# Patient Record
Sex: FEMALE | Race: WHITE | NOT HISPANIC OR LATINO | Employment: FULL TIME | URBAN - METROPOLITAN AREA
[De-identification: names, ages, dates, MRNs, and addresses within clinical notes are randomized per-mention and may not be internally consistent; named-entity substitution may affect disease eponyms.]

---

## 2018-02-26 ENCOUNTER — OFFICE VISIT CONVERTED (OUTPATIENT)
Dept: FAMILY MEDICINE CLINIC | Facility: CLINIC | Age: 23
End: 2018-02-26
Attending: FAMILY MEDICINE

## 2018-04-02 ENCOUNTER — OFFICE VISIT CONVERTED (OUTPATIENT)
Dept: FAMILY MEDICINE CLINIC | Facility: CLINIC | Age: 23
End: 2018-04-02
Attending: FAMILY MEDICINE

## 2018-05-03 ENCOUNTER — OFFICE VISIT CONVERTED (OUTPATIENT)
Dept: FAMILY MEDICINE CLINIC | Facility: CLINIC | Age: 23
End: 2018-05-03
Attending: FAMILY MEDICINE

## 2018-05-03 ENCOUNTER — CONVERSION ENCOUNTER (OUTPATIENT)
Dept: FAMILY MEDICINE CLINIC | Facility: CLINIC | Age: 23
End: 2018-05-03

## 2018-07-27 ENCOUNTER — OFFICE VISIT CONVERTED (OUTPATIENT)
Dept: FAMILY MEDICINE CLINIC | Facility: CLINIC | Age: 23
End: 2018-07-27
Attending: NURSE PRACTITIONER

## 2019-01-04 ENCOUNTER — HOSPITAL ENCOUNTER (OUTPATIENT)
Dept: OTHER | Facility: HOSPITAL | Age: 24
Discharge: HOME | End: 2019-01-04

## 2019-01-04 LAB
ANION GAP SERPL CALC-SCNC: 18 MMOL/L (ref 8–19)
BUN SERPL-MCNC: 9 MG/DL (ref 5–25)
BUN/CREAT SERPL: 13 {RATIO} (ref 6–20)
CALCIUM SERPL-MCNC: 9.5 MG/DL (ref 8.7–10.4)
CHLORIDE SERPL-SCNC: 99 MMOL/L (ref 99–111)
CONV CO2: 24 MMOL/L (ref 22–32)
CREAT UR-MCNC: 0.69 MG/DL (ref 0.5–0.9)
GFR SERPLBLD BASED ON 1.73 SQ M-ARVRAT: >60 ML/MIN/{1.73_M2}
GLUCOSE SERPL-MCNC: 67 MG/DL (ref 65–99)
OSMOLALITY SERPL CALC.SUM OF ELEC: 281 MOSM/KG (ref 273–304)
POTASSIUM SERPL-SCNC: 3.8 MMOL/L (ref 3.5–5.3)
SODIUM SERPL-SCNC: 137 MMOL/L (ref 135–147)
T4 FREE SERPL-MCNC: 1.1 NG/DL (ref 0.9–1.8)
TSH SERPL-ACNC: 1.84 M[IU]/L (ref 0.27–4.2)

## 2019-01-14 ENCOUNTER — HOSPITAL ENCOUNTER (OUTPATIENT)
Dept: OTHER | Facility: HOSPITAL | Age: 24
Discharge: HOME | End: 2019-01-14

## 2019-01-14 LAB
BASOPHILS # BLD AUTO: 0.06 10*3/UL (ref 0–0.2)
BASOPHILS NFR BLD AUTO: 0.65 % (ref 0–3)
EOSINOPHIL # BLD AUTO: 0.71 10*3/UL (ref 0–0.7)
EOSINOPHIL # BLD AUTO: 7.52 % (ref 0–7)
ERYTHROCYTE [DISTWIDTH] IN BLOOD BY AUTOMATED COUNT: 12 % (ref 11.5–14.5)
HBA1C MFR BLD: 13.6 G/DL (ref 12–16)
HCT VFR BLD AUTO: 41.3 % (ref 37–47)
LYMPHOCYTES # BLD AUTO: 2.89 10*3/UL (ref 1–5)
MCH RBC QN AUTO: 31.2 PG (ref 27–31)
MCHC RBC AUTO-ENTMCNC: 32.9 G/DL (ref 33–37)
MCV RBC AUTO: 95 FL (ref 81–99)
MONOCYTES # BLD AUTO: 0.53 10*3/UL (ref 0.2–1.2)
MONOCYTES NFR BLD AUTO: 5.59 % (ref 3–10)
NEUTROPHILS # BLD AUTO: 5.27 10*3/UL (ref 2–8)
NEUTROPHILS NFR BLD AUTO: 55.7 % (ref 30–85)
NRBC BLD AUTO-RTO: 0 % (ref 0–0.01)
PLATELET # BLD AUTO: 263 10*3/UL (ref 130–400)
PMV BLD AUTO: 8.3 FL (ref 7.4–10.4)
RBC # BLD AUTO: 4.34 10*6/UL (ref 4.2–5.4)
VARIANT LYMPHS NFR BLD MANUAL: 30.5 % (ref 20–45)
WBC # BLD AUTO: 9.46 10*3/UL (ref 4.8–10.8)

## 2019-02-09 ENCOUNTER — HOSPITAL ENCOUNTER (OUTPATIENT)
Dept: URGENT CARE | Facility: CLINIC | Age: 24
Discharge: HOME | End: 2019-02-09
Attending: NURSE PRACTITIONER

## 2019-02-15 ENCOUNTER — OFFICE VISIT CONVERTED (OUTPATIENT)
Dept: FAMILY MEDICINE CLINIC | Facility: CLINIC | Age: 24
End: 2019-02-15
Attending: FAMILY MEDICINE

## 2019-02-15 ENCOUNTER — CONVERSION ENCOUNTER (OUTPATIENT)
Dept: FAMILY MEDICINE CLINIC | Facility: CLINIC | Age: 24
End: 2019-02-15

## 2019-04-06 ENCOUNTER — HOSPITAL ENCOUNTER (OUTPATIENT)
Dept: OTHER | Facility: HOSPITAL | Age: 24
Discharge: HOME | End: 2019-04-06

## 2019-05-24 ENCOUNTER — CONVERSION ENCOUNTER (OUTPATIENT)
Dept: FAMILY MEDICINE CLINIC | Facility: CLINIC | Age: 24
End: 2019-05-24

## 2019-05-24 ENCOUNTER — OFFICE VISIT CONVERTED (OUTPATIENT)
Dept: FAMILY MEDICINE CLINIC | Facility: CLINIC | Age: 24
End: 2019-05-24
Attending: FAMILY MEDICINE

## 2019-09-27 ENCOUNTER — HOSPITAL ENCOUNTER (OUTPATIENT)
Dept: OTHER | Facility: HOSPITAL | Age: 24
Discharge: HOME | End: 2019-09-27

## 2019-09-27 LAB
ALBUMIN SERPL-MCNC: 5 G/DL (ref 3.5–5)
ALBUMIN/GLOB SERPL: 1.9 {RATIO} (ref 1.4–2.6)
ALP SERPL-CCNC: 48 U/L (ref 42–98)
ALT SERPL-CCNC: 6 U/L (ref 10–40)
ANION GAP SERPL CALC-SCNC: 16 MMOL/L (ref 8–19)
AST SERPL-CCNC: 10 U/L (ref 15–50)
BASOPHILS # BLD AUTO: 0.08 10*3/UL (ref 0–0.2)
BASOPHILS NFR BLD AUTO: 1.1 % (ref 0–3)
BILIRUB SERPL-MCNC: 0.4 MG/DL (ref 0.2–1.3)
BUN SERPL-MCNC: 9 MG/DL (ref 5–25)
BUN/CREAT SERPL: 11 {RATIO} (ref 6–20)
CALCIUM SERPL-MCNC: 9.8 MG/DL (ref 8.7–10.4)
CHLORIDE SERPL-SCNC: 105 MMOL/L (ref 99–111)
CHOLEST SERPL-MCNC: 143 MG/DL (ref 107–200)
CHOLEST/HDLC SERPL: 2.5 {RATIO} (ref 3–6)
CONV ABS IMM GRAN: 0.04 10*3/UL (ref 0–0.2)
CONV CO2: 22 MMOL/L (ref 22–32)
CONV IMMATURE GRAN: 0.6 % (ref 0–1.8)
CONV TOTAL PROTEIN: 7.7 G/DL (ref 6.3–8.2)
CREAT UR-MCNC: 0.81 MG/DL (ref 0.5–0.9)
DEPRECATED RDW RBC AUTO: 40.5 FL (ref 36.4–46.3)
EOSINOPHIL # BLD AUTO: 0.23 10*3/UL (ref 0–0.7)
EOSINOPHIL # BLD AUTO: 3.2 % (ref 0–7)
ERYTHROCYTE [DISTWIDTH] IN BLOOD BY AUTOMATED COUNT: 12 % (ref 11.7–14.4)
GFR SERPLBLD BASED ON 1.73 SQ M-ARVRAT: >60 ML/MIN/{1.73_M2}
GLOBULIN UR ELPH-MCNC: 2.7 G/DL (ref 2–3.5)
GLUCOSE SERPL-MCNC: 79 MG/DL (ref 65–99)
HCT VFR BLD AUTO: 41.3 % (ref 37–47)
HDLC SERPL-MCNC: 58 MG/DL (ref 40–60)
HGB BLD-MCNC: 13.4 G/DL (ref 12–16)
LDLC SERPL CALC-MCNC: 63 MG/DL (ref 70–100)
LYMPHOCYTES # BLD AUTO: 1.94 10*3/UL (ref 1–5)
LYMPHOCYTES NFR BLD AUTO: 27.4 % (ref 20–45)
MCH RBC QN AUTO: 29.8 PG (ref 27–31)
MCHC RBC AUTO-ENTMCNC: 32.4 G/DL (ref 33–37)
MCV RBC AUTO: 92 FL (ref 81–99)
MONOCYTES # BLD AUTO: 0.31 10*3/UL (ref 0.2–1.2)
MONOCYTES NFR BLD AUTO: 4.4 % (ref 3–10)
NEUTROPHILS # BLD AUTO: 4.48 10*3/UL (ref 2–8)
NEUTROPHILS NFR BLD AUTO: 63.3 % (ref 30–85)
NRBC CBCN: 0 % (ref 0–0.7)
OSMOLALITY SERPL CALC.SUM OF ELEC: 286 MOSM/KG (ref 273–304)
PLATELET # BLD AUTO: 237 10*3/UL (ref 130–400)
PMV BLD AUTO: 11 FL (ref 9.4–12.3)
POTASSIUM SERPL-SCNC: 4.2 MMOL/L (ref 3.5–5.3)
RBC # BLD AUTO: 4.49 10*6/UL (ref 4.2–5.4)
SODIUM SERPL-SCNC: 139 MMOL/L (ref 135–147)
TRIGL SERPL-MCNC: 109 MG/DL (ref 40–150)
TSH SERPL-ACNC: 1.86 M[IU]/L (ref 0.27–4.2)
VLDLC SERPL-MCNC: 22 MG/DL (ref 5–37)
WBC # BLD AUTO: 7.08 10*3/UL (ref 4.8–10.8)

## 2019-11-18 ENCOUNTER — HOSPITAL ENCOUNTER (OUTPATIENT)
Dept: FAMILY MEDICINE CLINIC | Facility: CLINIC | Age: 24
Discharge: HOME | End: 2019-11-18
Attending: FAMILY MEDICINE

## 2019-11-18 LAB
25(OH)D3 SERPL-MCNC: 27.8 NG/ML (ref 30–100)
ALBUMIN SERPL-MCNC: 4.3 G/DL (ref 3.5–5)
ALBUMIN/GLOB SERPL: 1.4 {RATIO} (ref 1.4–2.6)
ALP SERPL-CCNC: 39 U/L (ref 42–98)
ALT SERPL-CCNC: 6 U/L (ref 10–40)
ANION GAP SERPL CALC-SCNC: 18 MMOL/L (ref 8–19)
AST SERPL-CCNC: 10 U/L (ref 15–50)
BASOPHILS # BLD AUTO: 0.08 10*3/UL (ref 0–0.2)
BASOPHILS NFR BLD AUTO: 1.2 % (ref 0–3)
BILIRUB SERPL-MCNC: 0.36 MG/DL (ref 0.2–1.3)
BUN SERPL-MCNC: 8 MG/DL (ref 5–25)
BUN/CREAT SERPL: 10 {RATIO} (ref 6–20)
CALCIUM SERPL-MCNC: 9.3 MG/DL (ref 8.7–10.4)
CHLORIDE SERPL-SCNC: 104 MMOL/L (ref 99–111)
CHOLEST SERPL-MCNC: 182 MG/DL (ref 107–200)
CHOLEST/HDLC SERPL: 2.9 {RATIO} (ref 3–6)
CONV ABS IMM GRAN: 0.01 10*3/UL (ref 0–0.2)
CONV CO2: 22 MMOL/L (ref 22–32)
CONV IMMATURE GRAN: 0.2 % (ref 0–1.8)
CONV TOTAL PROTEIN: 7.3 G/DL (ref 6.3–8.2)
CREAT UR-MCNC: 0.79 MG/DL (ref 0.5–0.9)
DEPRECATED RDW RBC AUTO: 41.3 FL (ref 36.4–46.3)
EOSINOPHIL # BLD AUTO: 0.52 10*3/UL (ref 0–0.7)
EOSINOPHIL # BLD AUTO: 7.9 % (ref 0–7)
ERYTHROCYTE [DISTWIDTH] IN BLOOD BY AUTOMATED COUNT: 12 % (ref 11.7–14.4)
GFR SERPLBLD BASED ON 1.73 SQ M-ARVRAT: >60 ML/MIN/{1.73_M2}
GLOBULIN UR ELPH-MCNC: 3 G/DL (ref 2–3.5)
GLUCOSE SERPL-MCNC: 96 MG/DL (ref 65–99)
HCT VFR BLD AUTO: 41 % (ref 37–47)
HDLC SERPL-MCNC: 62 MG/DL (ref 40–60)
HGB BLD-MCNC: 13.6 G/DL (ref 12–16)
LDLC SERPL CALC-MCNC: 96 MG/DL (ref 70–100)
LYMPHOCYTES # BLD AUTO: 2.61 10*3/UL (ref 1–5)
LYMPHOCYTES NFR BLD AUTO: 39.5 % (ref 20–45)
MCH RBC QN AUTO: 30.7 PG (ref 27–31)
MCHC RBC AUTO-ENTMCNC: 33.2 G/DL (ref 33–37)
MCV RBC AUTO: 92.6 FL (ref 81–99)
MONOCYTES # BLD AUTO: 0.28 10*3/UL (ref 0.2–1.2)
MONOCYTES NFR BLD AUTO: 4.2 % (ref 3–10)
NEUTROPHILS # BLD AUTO: 3.11 10*3/UL (ref 2–8)
NEUTROPHILS NFR BLD AUTO: 47 % (ref 30–85)
NRBC CBCN: 0 % (ref 0–0.7)
OSMOLALITY SERPL CALC.SUM OF ELEC: 288 MOSM/KG (ref 273–304)
PLATELET # BLD AUTO: 262 10*3/UL (ref 130–400)
PMV BLD AUTO: 11.4 FL (ref 9.4–12.3)
POTASSIUM SERPL-SCNC: 3.5 MMOL/L (ref 3.5–5.3)
RBC # BLD AUTO: 4.43 10*6/UL (ref 4.2–5.4)
SODIUM SERPL-SCNC: 140 MMOL/L (ref 135–147)
TRIGL SERPL-MCNC: 122 MG/DL (ref 40–150)
TSH SERPL-ACNC: 3.09 M[IU]/L (ref 0.27–4.2)
VLDLC SERPL-MCNC: 24 MG/DL (ref 5–37)
WBC # BLD AUTO: 6.61 10*3/UL (ref 4.8–10.8)

## 2019-11-22 ENCOUNTER — CONVERSION ENCOUNTER (OUTPATIENT)
Dept: FAMILY MEDICINE CLINIC | Facility: CLINIC | Age: 24
End: 2019-11-22

## 2019-11-22 ENCOUNTER — OFFICE VISIT CONVERTED (OUTPATIENT)
Dept: FAMILY MEDICINE CLINIC | Facility: CLINIC | Age: 24
End: 2019-11-22
Attending: FAMILY MEDICINE

## 2020-01-17 ENCOUNTER — OFFICE VISIT CONVERTED (OUTPATIENT)
Dept: ORTHOPEDIC SURGERY | Facility: CLINIC | Age: 25
End: 2020-01-17
Attending: PHYSICIAN ASSISTANT

## 2020-05-06 ENCOUNTER — CONVERSION ENCOUNTER (OUTPATIENT)
Dept: OTHER | Facility: HOSPITAL | Age: 25
End: 2020-05-06

## 2020-05-06 ENCOUNTER — HOSPITAL ENCOUNTER (OUTPATIENT)
Dept: OTHER | Facility: HOSPITAL | Age: 25
Discharge: HOME | End: 2020-05-06

## 2020-05-07 ENCOUNTER — HOSPITAL ENCOUNTER (OUTPATIENT)
Dept: OTHER | Facility: HOSPITAL | Age: 25
Discharge: HOME | End: 2020-05-07
Attending: PHYSICIAN ASSISTANT

## 2020-05-07 ENCOUNTER — CONVERSION ENCOUNTER (OUTPATIENT)
Dept: OTHER | Facility: HOSPITAL | Age: 25
End: 2020-05-07

## 2020-05-08 LAB — SARS-COV-2 RNA SPEC QL NAA+PROBE: NOT DETECTED

## 2020-05-10 LAB — SARS-COV-2 RNA SPEC QL NAA+PROBE: NOT DETECTED

## 2020-06-10 ENCOUNTER — HOSPITAL ENCOUNTER (OUTPATIENT)
Dept: OTHER | Facility: HOSPITAL | Age: 25
Discharge: HOME | End: 2020-06-10
Attending: INTERNAL MEDICINE

## 2020-06-10 LAB
T4 FREE SERPL-MCNC: 1.3 NG/DL (ref 0.9–1.8)
TSH SERPL-ACNC: 2.41 M[IU]/L (ref 0.27–4.2)

## 2020-11-23 ENCOUNTER — HOSPITAL ENCOUNTER (OUTPATIENT)
Dept: FAMILY MEDICINE CLINIC | Facility: CLINIC | Age: 25
Discharge: HOME | End: 2020-11-23
Attending: PHYSICIAN ASSISTANT

## 2020-11-27 LAB — SARS-COV-2 RNA SPEC QL NAA+PROBE: NOT DETECTED

## 2020-12-07 ENCOUNTER — OFFICE VISIT CONVERTED (OUTPATIENT)
Dept: FAMILY MEDICINE CLINIC | Facility: CLINIC | Age: 25
End: 2020-12-07
Attending: FAMILY MEDICINE

## 2020-12-09 ENCOUNTER — HOSPITAL ENCOUNTER (OUTPATIENT)
Dept: LAB | Facility: HOSPITAL | Age: 25
Discharge: HOME | End: 2020-12-09
Attending: FAMILY MEDICINE

## 2020-12-09 LAB
25(OH)D3 SERPL-MCNC: 25.5 NG/ML (ref 30–100)
ALBUMIN SERPL-MCNC: 4.5 G/DL (ref 3.5–5)
ALBUMIN/GLOB SERPL: 1.9 {RATIO} (ref 1.4–2.6)
ALP SERPL-CCNC: 58 U/L (ref 42–98)
ALT SERPL-CCNC: 10 U/L (ref 10–40)
ANION GAP SERPL CALC-SCNC: 15 MMOL/L (ref 8–19)
AST SERPL-CCNC: 13 U/L (ref 15–50)
BILIRUB SERPL-MCNC: 0.33 MG/DL (ref 0.2–1.3)
BUN SERPL-MCNC: 8 MG/DL (ref 5–25)
BUN/CREAT SERPL: 9 {RATIO} (ref 6–20)
CALCIUM SERPL-MCNC: 9.3 MG/DL (ref 8.7–10.4)
CHLORIDE SERPL-SCNC: 105 MMOL/L (ref 99–111)
CONV CO2: 23 MMOL/L (ref 22–32)
CONV TOTAL PROTEIN: 6.9 G/DL (ref 6.3–8.2)
CREAT UR-MCNC: 0.86 MG/DL (ref 0.5–0.9)
FOLATE SERPL-MCNC: 12.1 NG/ML (ref 4.8–20)
GFR SERPLBLD BASED ON 1.73 SQ M-ARVRAT: >60 ML/MIN/{1.73_M2}
GLOBULIN UR ELPH-MCNC: 2.4 G/DL (ref 2–3.5)
GLUCOSE SERPL-MCNC: 89 MG/DL (ref 65–99)
OSMOLALITY SERPL CALC.SUM OF ELEC: 286 MOSM/KG (ref 273–304)
POTASSIUM SERPL-SCNC: 3.9 MMOL/L (ref 3.5–5.3)
SODIUM SERPL-SCNC: 139 MMOL/L (ref 135–147)
T4 FREE SERPL-MCNC: 1.2 NG/DL (ref 0.9–1.8)
TSH SERPL-ACNC: 1.01 M[IU]/L (ref 0.27–4.2)
VIT B12 SERPL-MCNC: 428 PG/ML (ref 211–911)

## 2020-12-10 LAB — SARS-COV-2 AB SERPL QL IA: NEGATIVE

## 2020-12-28 ENCOUNTER — HOSPITAL ENCOUNTER (OUTPATIENT)
Dept: OTHER | Facility: HOSPITAL | Age: 25
Discharge: HOME | End: 2020-12-28
Attending: INTERNAL MEDICINE

## 2021-01-21 ENCOUNTER — HOSPITAL ENCOUNTER (OUTPATIENT)
Dept: OTHER | Facility: HOSPITAL | Age: 26
Discharge: HOME | End: 2021-01-21
Attending: INTERNAL MEDICINE

## 2021-03-29 ENCOUNTER — OFFICE VISIT CONVERTED (OUTPATIENT)
Dept: FAMILY MEDICINE CLINIC | Facility: CLINIC | Age: 26
End: 2021-03-29
Attending: PHYSICIAN ASSISTANT

## 2021-04-01 ENCOUNTER — HOSPITAL ENCOUNTER (OUTPATIENT)
Dept: GENERAL RADIOLOGY | Facility: HOSPITAL | Age: 26
Discharge: HOME | End: 2021-04-01
Attending: PHYSICIAN ASSISTANT

## 2021-04-01 ENCOUNTER — HOSPITAL ENCOUNTER (OUTPATIENT)
Dept: LAB | Facility: HOSPITAL | Age: 26
Discharge: HOME | End: 2021-04-01
Attending: PHYSICIAN ASSISTANT

## 2021-04-13 ENCOUNTER — OFFICE VISIT CONVERTED (OUTPATIENT)
Dept: CARDIOLOGY | Facility: CLINIC | Age: 26
End: 2021-04-13
Attending: SPECIALIST

## 2021-04-21 ENCOUNTER — HOSPITAL ENCOUNTER (OUTPATIENT)
Dept: GENERAL RADIOLOGY | Facility: HOSPITAL | Age: 26
Discharge: HOME | End: 2021-04-21
Attending: PHYSICIAN ASSISTANT

## 2021-05-11 NOTE — PROCEDURES
"   Procedure Note      Patient Name: Maurice Lazo   Patient ID: [id number]   Sex: Female   YOB: 1995    Primary Care Provider: Shahriar De Leon PA-C   Referring Provider: Shahriar De Leon PA-C    Visit Date: April 13, 2021    Provider: Craig Silvestre MD   Location: [facility]   Location Address: [address] [place] [address], [address]   [place]  [id number]   Location Phone: [phone]          FINAL REPORT   TRANSTHORACIC ECHOCARDIOGRAM REPORT    Diagnosis: Tachycardia   Height: 5'10\" Weight: 142 B/P: 118/68 BSA: 1.8   Tech: BNS   MEASUREMENTS:  RVID (Diastole) : RVID. (NORMAL: 0.7 to 2.4 cm max)   LVID (Systole): 2.9 cm (Diastole): 4.3 cm . (NORMAL: 3.7 - 5.4 cm)   Posterior Wall Thickness (Diastole): 0.7 cm. (NORMAL: 0.8 - 1.1 cm)   Septal Thickness (Diastole): 0.5 cm. (NORMAL: 0.7 - 1.2 cm)   LAID (Systole): 2.4 cm. (NORMAL: 1.9 - 3.8 cm)   Aortic Root Diameter (Diastole): 3.2 cm. (NORMAL: 2.0 - 3.7 cm)   DOPPLER:  E/A ratio 1.4 (NORMAL 0.8-2.0)   DT: 183 msec (NORMAL 140-240 msec.)   IVRT 77 m/sec (NORMAL  m/sec.)   E/E': 5 (NORMAL <8 avg.)   COMMENTS:  The patient underwent 2-D, M-Mode, and Doppler examination, including pulse-wave, continuous-wave, and color-flow analysis; the study is technically adequate.   FINDINGS:  AORTIC VALVE: Normal. Tricuspid in appearance with normal central closure.   MITRAL VALVE: Normal. Bicuspid in appearance.   TRICUSPID VALVE: Normal.   PULMONIC VALVE: Not well visualized.   LEFT ATRIUM: Normal. No intracavitary masses or clots seen. LA volume index is 14 mL/m2.   AORTIC ROOT: Normal in size with adequate motion.   LEFT VENTRICLE: Normal left ventricular systolic function. Ejection fraction 57%.   RIGHT ATRIUM: Normal.   RIGHT VENTRICLE: Normal size and function.   PERICARDIUM: Unremarkable. No evidence of effusion.   INFERIOR VENA CAVA: Diameter is 1.1 cm.   DOPPLER: Doppler examination of the aortic, mitral, tricuspid, and pulmonary valves was " performed. Normal pulmonary artery systolic pressure by Doppler. There was trace mitral regurgitation.   Faxed: 04/16/2021      CONCLUSION:  1.  Normal left ventricular systolic function.  2.  Trace mitral regurgitation.      Craig Silvestre MD  APARNA/pap                 Electronically Signed by: Laura Amaya-, Other -Author on April 16, 2021 10:20:45 AM  Electronically Co-signed by: Craig Silvestre MD -Reviewer on April 29, 2021 09:49:32 AM

## 2021-05-11 NOTE — PROCEDURES
Procedure Note      Patient Name: Maurice Lazo   Patient ID: [id number]   Sex: Female   YOB: 1995    Primary Care Provider: Shahriar De Leon PA-C   Referring Provider: Shahriar De Leon PA-C    Visit Date: April 13, 2021    Provider: Craig Silvestre MD   Location: [facility]   Location Address: [address] [place] [address], [place], [place]  [id number]   Location Phone: [phone]          FINAL REPORT   HOLTER MONITOR REPORT  Date: 04/13/2021   Indication: Tachycardia      Interpretation Date:  04/15/2021    24-HOUR HOLTER MONITOR    FINDINGS:   The patient was monitored for a period of 24 hours.  The maximum heart rate was 157 beats per minute, the minimum was 73 beats per minute. The average was 103 beats per minute. There were occasional PACs and PVCs.     CONCLUSION:    24-hour Holter monitor study reveals sinus rhythm. There is underlying sinus tachycardia. Occasional PACs and PVCs.      Craig Silvestre MD  APARNA/pap                   Electronically Signed by: Laura Amaya-, Other -Author on April 16, 2021 10:26:08 AM  Electronically Co-signed by: Craig Silvestre MD -Reviewer on April 29, 2021 09:51:44 AM

## 2021-05-14 VITALS
OXYGEN SATURATION: 97 % | RESPIRATION RATE: 18 BRPM | DIASTOLIC BLOOD PRESSURE: 68 MMHG | SYSTOLIC BLOOD PRESSURE: 118 MMHG | BODY MASS INDEX: 20.96 KG/M2 | TEMPERATURE: 98.1 F | HEIGHT: 69 IN | HEART RATE: 112 BPM | WEIGHT: 141.5 LBS

## 2021-05-14 NOTE — PROGRESS NOTES
Progress Note      Patient Name: Maurice Lazo   Patient ID: [id number]   Sex: Female   YOB: 1995    Primary Care Provider: Jarred Mendenhall MD    Visit Date: 2021    Provider: Shahriar De Leon PA-C   Location: [facility]   Location Address: [address] [place] [address], [address]  [place], [place]  [id number]   Location Phone: [phone]          Chief Complaint  · new patient/establish care  · bilateral hip pain/left side neck      History Of Present Illness  Maurice Lazo is a 25 year old /White female who presents for evaluation and treatment of: new patient/establish care.      pt presents today as new patient/establish care.    pt previous PCP was Jarred Mendenhall MD.    pt has a hx of elevated HR, anxiety, migraine, asthma and thyroid disease.    pt has been having bilateral hip and left neck pain for the last 6 months. pt has seen chiro and was told no curve in neck. no injuries. pt states she is stiff all the time.    pts HR has been elevated on and off for the last 3 years, runs in 120s. pt has occ palpitations w/tunnel vision. EKG  normal.    pt is currently taking levothyroxine 50mcg for thyroid disease, pt was dx in .    pt currently sees psych-LACHELLE Wing for bipolar 1, currently taking Pristiq 50mg    pt has had both Covid vaccines    Labs   pap -EPW Herrera  flu 10/20    Pt was tx for JRA as a child for about 1 year then was told she just has double joints.   vaginal delivery  Pt does smoke occ.  Quitting       Past Medical History  Disease Name Date Onset Notes   Allergy --  --    Anxiety --  --    Asthma --  --    Bipolar 1 disorder --  --    Depression --  --    Migraine headache --  --    Reflux Disease --  --    Thyroid disorder --  --          Past Surgical History  Procedure Name Date Notes   Rotator Cuff repair --  --    Surgical Clips --  --          Medication List  Name Date Started Instructions   albuterol sulfate  2.5 mg /3 mL (0.083 %) inhalation solution for nebulization 02/26/2018 inhale 3 milliliters (2.5 mg) by nebulization route 3 times per day as needed   albuterol sulfate 90 mcg/actuation inhalation HFA aerosol inhaler 04/01/2021 inhale 2 puffs (180 mcg) by inhalation route every 4-6 hours as needed   Allegra Allergy 180 mg oral tablet  take 1 tablet (180 mg) by oral route once daily   cholecalciferol (vitamin D3) 1,250 mcg (50,000 unit) oral capsule 12/14/2020 take 1 capsule by oral route q weekly   cyclobenzaprine 5 mg oral tablet 12/15/2020 take 1 tablet (5 mg) by oral route BID prn   levothyroxine 50 mcg oral tablet 06/09/2020 take 1 tablet by oral route daily for 30 days   Pristiq 50 mg oral tablet extended release 24 hr  take 1 tablet (50 mg) by oral route once daily   Singulair 10 mg oral tablet 12/07/2020 TAKE ONE TABLET BY MOUTH DAILY IN THE EVENING   trazodone 50 mg oral tablet 12/07/2020 take 1 tablet (50 mg) by oral route once daily at bedtime for 30 days   Tums 200 mg calcium (500 mg) oral tablet,chewable  chew 1 tablet by oral route As needed         Allergy List  Allergen Name Date Reaction Notes   Demerol --  --  --    Lamictal --  --  --          Family Medical History  Disease Name Relative/Age Notes   Stroke Mother/   Mother   Lung cancer Grandmother (maternal)/   --          Social History  Finding Status Start/Stop Quantity Notes   Alcohol Current some day --/-- --  --    Alcohol Use Current some day --/-- --  rarely drinks   . --  --/-- --  --    lives alone --  --/-- --  --    Recreational Drug Use Never --/-- --  no   Tobacco Current every day --/-- 1/2 PPD current every day smoker, 1 packs per day, smoked 6-10 years   Working --  --/-- --  --          Immunizations  NameDate Admin Mfg Trade Name Lot Number Route Inj VIS Given VIS Publication   Hepatitis A05/24/2019 SKB HAVRIX-ADULT [id number] IM LD 05/24/2019    Comments:    Hepatitis A05/03/2018 JOSE HAVRIX-ADULT  IM LD 05/03/2018  10/25/2011   Comments:    Tcwzubhzm01/17/2017 SKB Fluarix, quadrivalent, preservative free [id number] IM LD 10/17/2017 08/07/2015   Comments: pt left in stable condition   Yradumnwu30/07/2018 MSD VARIVAX [id number] SC  12/07/2018 02/12/2018   Comments: pt left office in stable condition   Choprshnm07/06/2018 MSD VARIVAX [id number] SC  11/06/2018 02/12/2018   Comments: pt left office in stable condition         Review of Systems  · Constitutional  o Denies  o : fever, fatigue, weight loss, weight gain  · Cardiovascular  o Denies  o : lower extremity edema, claudication, chest pressure, palpitations  · Respiratory  o Denies  o : shortness of breath, wheezing, cough, hemoptysis, dyspnea on exertion  · Gastrointestinal  o Denies  o : nausea, vomiting, diarrhea, constipation, abdominal pain      Physical Examination  · Constitutional  o Appearance  o : well developed, well-nourished, no acute distress  · Head and Face  o Head  o : normocephalic, atraumatic  · Ears, Nose, Mouth and Throat  o Ears  o :   § External Ears  § : external auditory canal appearance normal, no discharge present  § Otoscopic Examination  § : tympanic membranes pearly white/gray bilaterally  o Nose  o :   § External Nose  § : no lesions noted  § Nasopharynx  § : no discharge present  o Oral Cavity  o :   § Oral Mucosa  § : oral mucosa light pink  o Throat  o :   § Oropharynx  § : tonsils without exudate, no palatal petechiae  · Neck  o Inspection/Palpation  o : tenderness present   o Thyroid  o : gland size normal, nontender, no nodules or masses present on palpation  · Respiratory  o Respiratory Effort  o : breathing unlabored  o Inspection of Chest  o : chest rise symmetric bilaterally  o Auscultation of Lungs  o : clear to auscultation bilaterally throughout inspiration and expiration  · Cardiovascular  o Heart  o :   § Auscultation of Heart  § : regular rate and rhythm, no murmurs, gallops or rubs  o Peripheral Vascular System  o :   § Extremities  § :  no edema  · Lymphatic  o Neck  o : no cervical lymphadenopathy, no supraclavicular lymphadenopathy  · Psychiatric  o Mood and Affect  o : mood normal, affect appropriate     BACK - palp tend in the lumbar spine, full rom, neg SLR  EKG- NSR without any acute changes           Assessment  · Anxiety disorder     300.00/F41.9  · Asthma     493.90/J45.909  · Cervical pain (neck)     723.1/M54.2  · Depression     311/F32.9  · Hypothyroidism     244.9/E03.9  · Nicotine dependence     305.1/F17.200  · Polyarthralgia     719.49/M25.50  · Screening for depression     V79.0/Z13.89  · Need for influenza vaccination     V04.81/Z23  · Migraine     346.90/G43.909  · Thyroid disease     246.9/E07.9  · Bipolar 1 disorder     296.7/F31.9  · Tachycardia     785.0/R00.0      Plan  · Orders  o Cervical Spine Complete [facility] (11264) - 723.1/M54.2 - 03/29/2021  o RA Profile 2 (Ca, Phos, Alk Phos, CRP, ESR, Uric Acid, ASO, RF IgM, DORINA) [facility] (20562, 79754, 84324, 75632, 13706, 72898, 11135, 07444, 72343) - 719.49/M25.50 - 03/29/2021  o ACO-18: Negative screen for clinical depression using a standardized tool () - V79.0/Z13.89 - 03/29/2021  o ACO-14: Influenza immunization was not administered for reasons documented () - V04.81/Z23 - 03/29/2021   rcvd 10/20  o ACO-39: Current medications updated and reviewed (6039F, ) - - 03/29/2021  o Hip Bilateral with AP Pelvis X-Ray [facility] (25360) - - 03/29/2021  o EKG (Recording and Interpretation) [facility] (Done and read at [facility]) (27816) - - 03/29/2021  o Echocardiogram - Complete [facility] (11722, 38510, 28062) - - 03/30/2021  o Holter Monitor 24 Hour [facility] (64123) - - 04/01/2021  · Medications  o ALBUTEROL HFA 90 MCG INHALER 0   SIG: INHALE TWO PUFFS BY MOUTH EVERY 4 TO 6 HOURS AS NEEDED   DISP: (18) Each with 3 refills  Refilled on 03/29/2021     o Medications have been Reconciled  o Transition of Care or Provider Policy  · Instructions  o *Form of nicotine being used: cigs  o Depression Screen completed  and scanned into the EMR under the designated folder within the patient's documents.  o Today's PHQ-9 result is _4__  o Flu vaccine declined.  o Patient instructed/educated on their diet and exercise program.  o Patient was educated/instructed on their diagnosis, treatment and medications prior to discharge from the clinic today.  o Patient counseled to stop smoking.  o Discussed Covid-19 precautions including, but not limited to, social distancing, avoid touching your face, and hand washing.   · Disposition  o Call or Return if symptoms worsen or persist.  o F/U in 3 months.  o Care Transition            Electronically Signed by: Shahriar De Leon PA-C -Author on April 16, 2021 08:34:23 AM

## 2021-05-15 VITALS — HEIGHT: 69 IN | BODY MASS INDEX: 20.22 KG/M2 | OXYGEN SATURATION: 99 % | HEART RATE: 110 BPM | WEIGHT: 136.5 LBS

## 2021-05-15 VITALS
WEIGHT: 142.5 LBS | TEMPERATURE: 98.1 F | OXYGEN SATURATION: 98 % | HEIGHT: 69 IN | HEART RATE: 102 BPM | DIASTOLIC BLOOD PRESSURE: 64 MMHG | BODY MASS INDEX: 21.11 KG/M2 | SYSTOLIC BLOOD PRESSURE: 118 MMHG

## 2021-05-15 VITALS
DIASTOLIC BLOOD PRESSURE: 78 MMHG | WEIGHT: 135.56 LBS | HEART RATE: 115 BPM | BODY MASS INDEX: 20.08 KG/M2 | SYSTOLIC BLOOD PRESSURE: 122 MMHG | TEMPERATURE: 97.4 F | OXYGEN SATURATION: 98 % | HEIGHT: 69 IN

## 2021-05-15 VITALS — TEMPERATURE: 98.1 F | HEART RATE: 117 BPM | OXYGEN SATURATION: 100 %

## 2021-05-15 VITALS — TEMPERATURE: 98.8 F

## 2021-05-16 VITALS
SYSTOLIC BLOOD PRESSURE: 108 MMHG | OXYGEN SATURATION: 98 % | HEIGHT: 69 IN | HEART RATE: 101 BPM | WEIGHT: 137.37 LBS | DIASTOLIC BLOOD PRESSURE: 64 MMHG | TEMPERATURE: 98.2 F | BODY MASS INDEX: 20.35 KG/M2

## 2021-05-16 VITALS
DIASTOLIC BLOOD PRESSURE: 72 MMHG | WEIGHT: 141.56 LBS | TEMPERATURE: 97.5 F | HEART RATE: 114 BPM | HEIGHT: 69 IN | SYSTOLIC BLOOD PRESSURE: 117 MMHG | OXYGEN SATURATION: 96 % | BODY MASS INDEX: 20.97 KG/M2

## 2021-05-16 VITALS
HEART RATE: 76 BPM | SYSTOLIC BLOOD PRESSURE: 117 MMHG | DIASTOLIC BLOOD PRESSURE: 69 MMHG | WEIGHT: 143.12 LBS | BODY MASS INDEX: 21.2 KG/M2 | OXYGEN SATURATION: 96 % | TEMPERATURE: 98.3 F | HEIGHT: 69 IN

## 2021-05-16 VITALS
TEMPERATURE: 98.6 F | DIASTOLIC BLOOD PRESSURE: 68 MMHG | OXYGEN SATURATION: 99 % | WEIGHT: 139.25 LBS | SYSTOLIC BLOOD PRESSURE: 117 MMHG | BODY MASS INDEX: 20.62 KG/M2 | HEART RATE: 110 BPM | HEIGHT: 69 IN

## 2021-05-16 VITALS
SYSTOLIC BLOOD PRESSURE: 122 MMHG | BODY MASS INDEX: 20.23 KG/M2 | HEIGHT: 69 IN | TEMPERATURE: 98.5 F | OXYGEN SATURATION: 97 % | HEART RATE: 99 BPM | DIASTOLIC BLOOD PRESSURE: 66 MMHG | WEIGHT: 136.56 LBS

## 2021-05-26 ENCOUNTER — CONVERSION ENCOUNTER (OUTPATIENT)
Dept: OTHER | Facility: HOSPITAL | Age: 26
End: 2021-05-26

## 2021-05-26 ENCOUNTER — CONVERSION ENCOUNTER (OUTPATIENT)
Dept: PLASTIC SURGERY | Facility: CLINIC | Age: 26
End: 2021-05-26

## 2021-05-26 ENCOUNTER — OFFICE VISIT CONVERTED (OUTPATIENT)
Dept: PLASTIC SURGERY | Facility: CLINIC | Age: 26
End: 2021-05-26
Attending: NURSE PRACTITIONER

## 2021-05-26 LAB
BILIRUB UR QL STRIP: NEGATIVE
COLOR UR: YELLOW
CONV CLARITY OF URINE: CLEAR
CONV PROTEIN IN URINE BY AUTOMATED TEST STRIP: NORMAL
CONV UROBILINOGEN IN URINE BY AUTOMATED TEST STRIP: NORMAL
GLUCOSE UR QL: NEGATIVE
HGB UR QL STRIP: NORMAL
KETONES UR QL STRIP: NEGATIVE
LEUKOCYTE ESTERASE UR QL STRIP: NORMAL
NITRITE UR QL STRIP: NEGATIVE
PH UR STRIP.AUTO: 5.5 [PH]
SP GR UR: 1.03

## 2021-06-05 NOTE — H&P
History and Physical      Patient Name: Maurice Lazo   Patient ID: [id number]   Sex: Female   YOB: 1995    Primary Care Provider: Shahriar De Leon PA-C   Referring Provider: Shahriar De Leon PA-C    Visit Date: May 26, 2021    Provider: LACHELLE Waldron   Location: [facility] [place]   Location Address: [address] [place] [address]  [id number]   Location Phone: [phone]          History Of Present Illness  Maurice Lazo is a 25 year old /White female who presents to the office today as a consult from Shahriar De Leon PA-C.   Maurice Lazo is a 25 year old /White female who presents to the office for a Restylane Kysse in the lips.       Past Medical History  Allergy; Anxiety; Asthma; Bipolar 1 disorder; Depression; Facial aging; Migraine headache; Nicotine dependence; Reflux Disease; Thyroid disorder         Past Surgical History  D & C; Rotator Cuff repair; Shoulder surgery; Surgical Clips; Cosby Tooth Extraction         Medication List  albuterol sulfate 2.5 mg /3 mL (0.083 %) inhalation solution for nebulization; albuterol sulfate 90 mcg/actuation inhalation HFA aerosol inhaler; Allegra Allergy 180 mg oral tablet; cholecalciferol (vitamin D3) 1,250 mcg (50,000 unit) oral capsule; cyclobenzaprine 5 mg oral tablet; Diflucan 150 mg oral tablet; levothyroxine 50 mcg oral tablet; Macrobid 100 mg oral capsule; Pristiq 50 mg oral tablet extended release 24 hr; pseudoephedrine HCl 60 mg oral tablet; Singulair 10 mg oral tablet; trazodone 50 mg oral tablet; Tums 200 mg calcium (500 mg) oral tablet,chewable         Allergy List  Demerol; Lamictal       Allergies Reconciled  Family Medical History  Stroke; Heart Disease; Lung cancer         Social History  Alcohol (Current some day); Alcohol Use (Current some day); .; lives alone; Recreational Drug Use (Never); Tobacco (Former); Working         Immunizations  Name Date Admin   Hepatitis A 05/24/2019    Hepatitis A 05/03/2018   Influenza 10/17/2017   Varicella 12/07/2018   Varicella 11/06/2018         Review of Systems  · Cardiovascular  o Denies  o : chest pain, lower extremity edema, Heart Attack, Abnormal EKG  · Respiratory  o Denies  o : shortness of breath, wheezing, cough  · Neurologic  o Denies  o : muscular weakness, incoordination, tingling or numbness, headaches  · Psychiatric  o Denies  o : anxiety, depression, difficulty sleeping      Vitals  Date Time BP Position Site L\R Cuff Size HR RR TEMP (F) WT  HT  BMI kg/m2 BSA m2 O2 Sat FR L/min FiO2 HC       05/26/2021 03:36 PM        98.4                 Physical Examination  · Head and Face  o Face  o :   § Inspection  § : no open areas, skin intact, upper lip thin in ration to bottom lip  o Visia  o :   § Spots  § : %  § Wrinkles  § : %  § Texture  § : %  § Pores  § : %  § UV Spots  § : %  § Brown Spots  § : %  § Red Areas  § : %  § Porphyrins  § : %  · Respiratory  o Respiratory Effort  o : breathing unlabored   · Cardiovascular  o Heart  o : regular rate          Assessment  · Facial aging     701.8/R23.8      Plan  · Orders  o Plastics cosmetic visit ([facility]) - - 05/26/2021   No consult fee, purchased promotion  o RESTYLANE KYSSE (RESKY) - - 05/26/2021  · Medications  o Medications have been Reconciled  o Transition of Care or Provider Policy  · Instructions  o **FILLER**  o The indications, benefits, risks, alternatives, expected outcomes, and complications as to the injection of Restylane Kysse filler was discussed with the patient. Informed consent was obtained. Patient expressed understanding, accept risks, consent and wish to proceed. Lidocaine cream was applied to face and allowed to sit for 30 minutes. After this time area was prepped with ChloraPrep. I then proceeded to inject the lips with filler, Taking care to aspirate prior to injection and injected in small aliquots. The patient tolerated the procedure well with no immediate complications.  The patient was given an ice pack and post procedure instructions. The patient will follow-up in a week. Patient tolerated the procedure well.   o Riana Rojas 1ML; Lot#: [id number]; Exp: 09/30/2022  o Filler education sheet given.  o Electronically Identified Patient Education Materials Provided Electronically            Electronically Signed by: LACHELLE Waldron -Author on May 26, 2021 05:02:11 PM

## 2021-06-09 ENCOUNTER — OFFICE VISIT (OUTPATIENT)
Dept: PLASTIC SURGERY | Facility: CLINIC | Age: 26
End: 2021-06-09

## 2021-06-09 VITALS — TEMPERATURE: 98.2 F

## 2021-06-09 DIAGNOSIS — R23.8 FACIAL AGING: Primary | ICD-10-CM

## 2021-06-09 PROCEDURE — COS64: Performed by: NURSE PRACTITIONER

## 2021-06-09 NOTE — PROGRESS NOTES
Chief Complaint  Follow-up (lip filler follow up)    Subjective            History of Present Illness  Maurice Lazo is a 25 y.o. female who presents to [facility] as a follow up on lip filler. Happy with results.     She complains of facial aging and decreased lip volume.  She does not have a history of Accutane use.  She does not have a history of cold sores.      Allergies: Demerol [meperidine] and Lamictal [lamotrigine]  Allergies Reconciled.    Review of Systems     Objective     Temp 98.2 °F (36.8 °C) (Temporal)     There is no height or weight on file to calculate BMI.    Physical Exam  Head and Face  • Face:  o Inspection: Improved lip ratio and contour        Result Review :   The following data was reviewed by: LACHELLE Waldron on 06/09/2021:     Assessment and Plan      Diagnoses and all orders for this visit:    1. Facial aging (Primary)        Plan:  • Patient happy with results. Pt has post-procedure instructions.  May return to clinic as needed.        Follow Up     Return any concerns or questions.    Patient was given instructions and counseling regarding her condition or for health maintenance advice. Please see specific information pulled into the AVS if appropriate.     LACHELLE Waldron  06/09/2021

## 2021-06-23 ENCOUNTER — TELEPHONE (OUTPATIENT)
Dept: PHYSICAL THERAPY | Facility: CLINIC | Age: 26
End: 2021-06-23

## 2021-06-28 DIAGNOSIS — E55.9 VITAMIN D DEFICIENCY: Primary | ICD-10-CM

## 2021-06-28 RX ORDER — ALBUTEROL SULFATE 90 UG/1
AEROSOL, METERED RESPIRATORY (INHALATION)
COMMUNITY
Start: 2021-05-10 | End: 2021-07-22 | Stop reason: SDUPTHER

## 2021-06-28 RX ORDER — MONTELUKAST SODIUM 10 MG/1
TABLET ORAL
COMMUNITY
Start: 2021-04-03 | End: 2021-07-06

## 2021-06-28 RX ORDER — CETIRIZINE HYDROCHLORIDE 5 MG/1
10 TABLET ORAL DAILY
COMMUNITY
End: 2022-02-28

## 2021-06-28 RX ORDER — LEVOTHYROXINE SODIUM 0.05 MG/1
TABLET ORAL
COMMUNITY
Start: 2021-04-15 | End: 2021-07-29 | Stop reason: SDUPTHER

## 2021-07-06 RX ORDER — MONTELUKAST SODIUM 10 MG/1
TABLET ORAL
Qty: 90 TABLET | Refills: 1 | Status: SHIPPED | OUTPATIENT
Start: 2021-07-06 | End: 2021-10-12 | Stop reason: SDUPTHER

## 2021-07-14 ENCOUNTER — TREATMENT (OUTPATIENT)
Dept: PHYSICAL THERAPY | Facility: CLINIC | Age: 26
End: 2021-07-14

## 2021-07-14 ENCOUNTER — TRANSCRIBE ORDERS (OUTPATIENT)
Dept: PHYSICAL THERAPY | Facility: CLINIC | Age: 26
End: 2021-07-14

## 2021-07-14 DIAGNOSIS — M54.2 PAIN, NECK: Primary | ICD-10-CM

## 2021-07-14 DIAGNOSIS — M53.84 DECREASED ROM OF THORACIC SPINE: ICD-10-CM

## 2021-07-14 DIAGNOSIS — R29.898 WEAKNESS OF SHOULDER: ICD-10-CM

## 2021-07-14 PROCEDURE — 97161 PT EVAL LOW COMPLEX 20 MIN: CPT | Performed by: PHYSICAL THERAPIST

## 2021-07-14 PROCEDURE — 97110 THERAPEUTIC EXERCISES: CPT | Performed by: PHYSICAL THERAPIST

## 2021-07-14 NOTE — PROGRESS NOTES
Physical Therapy Initial Evaluation and Plan of Care      Patient: Maurice Lazo   : 1995  Diagnosis/ICD-10 Code:  Pain, neck [M54.2]  Referring practitioner: No ref. provider found  Date of Initial Visit: 2021  Today's Date: 2021  Patient seen for 1 sessions           Subjective Questionnaire: NDI:=14%=1-19%      Subjective Evaluation    Pain  Current pain ratin  At best pain ratin  At worst pain ratin  Aggravating factors: lifting, movement and prolonged positioning    Hand dominance: right    Patient Goals  Patient goals for therapy: decreased pain, improved balance, increased motion, independence with ADLs/IADLs, return to sport/leisure activities and increased strength       Pt presents with stiffness in her neck. She gets some numbness and tingling down to lateral shoulder. Certain stretches with her neck Increase the numbness. She does note her L shoulder pops a little more easily like it is more unstable ballesteros R.     Past medical history: B shoulder instability with surgery almost 10 years.       Objective          Active Range of Motion   Cervical/Thoracic Spine   Cervical    Flexion: WFL  Extension: WFL  Left rotation: 65 degrees   Right rotation: 65 degrees   Left Shoulder   Flexion: 150 degrees   External rotation BTH: T3   Internal rotation BTB: T4     Right Shoulder   Flexion: 150 degrees   External rotation BTH: T3   Internal rotation BTB: T8     Strength/Myotome Testing     Left Shoulder     Planes of Motion   Flexion: 4+   Abduction: 4+   External rotation at 0°: 4+   External rotation at 45°: 4+   External rotation at 90°: 4   Internal rotation at 0°: 4+   Internal rotation at 90°: 4     Isolated Muscles   Lower trapezius: 3+   Middle trapezius: 4-     Right Shoulder     Planes of Motion   Flexion: 5   Abduction: 5   External rotation at 0°: 5   External rotation at 45°: 4+   External rotation at 90°: 4   Internal rotation at 0°: 5   Internal rotation at 45°: 4+    Internal rotation at 90°: 4+     Isolated Muscles   Lower trapezius: 4-   Middle trapezius: 4-     Cervical Flexibility Comments:   Seated thoracic rotation % to L, limited to 75% on right with slight pain/toghtness           Assessment & Plan     Assessment  Impairments: abnormal or restricted ROM, impaired physical strength and pain with function  Assessment details: Pt presents with limitations, noted below, that impede her ability to tolerate work and ADL's due to pain and stiffness in cervical spine with intermittent L arm numbness/tingling. Pt with decreased thoracic mobility in to extension and R rotation, B scapular weakness and L shoulder weakness compared to R. Pt would benefit from thoracic mobility and scapular strengthening to reduce use of upper trap compensation with functional use of BUE and reduce cervical tension The skills of a therapist will be required to safely and effectively implement the following treatment plan to restore maximal level of function.      Functional Limitations: carrying objects, lifting, sleeping, pulling, pushing, uncomfortable because of pain, moving in bed, reaching behind back, reaching overhead and unable to perform repetitive tasks  Goals  Plan Goals: 1. The patient has complaints of pain.     LTG 1: 12 weeks:  The patient will report 1/10 pain in order to more easily tolerate activities of daily living and improve sleep quality.   STATUS:  New     STG 2a: 6 weeks:  The patient will report 2/10 pain.   STATUS:  New         2. The patient reports radicular symptoms in the L upper extremity.     LTG 3: 12 weeks:  The patient will report a decrease in radicular symptoms in the L upper extremity by 100%.   STATUS:  New     STG 3a: 6 weeks:  The patient will report a decrease in radicular symptoms in the L upper extremity by 50%.   STATUS:  New       3. Carrying, Moving, and Handling Objects Functional Limitation       LTG 3: 12 weeks:  The patient will demonstrate  1-19% limitation by achieving a score of 3 on the Neck Disability Index.   STATUS:  New     STG 3a: 6 weeks:  The patient will demonstrate 1-19% limitation by achieving a score of 5 on the Neck Disability Index.     STATUS:  New         4. The patient has limited strength of the B shoulder.     LTG 4: 12 weeks:  The patient will demonstrate 5/5 strength for B shoulder flexion, abduction, external rotation, and internal rotation and grossly 4+/5 strength for scapular retraction/depression in order to demonstrate improved shoulder stability.   STATUS:  New     STG 4a: 6 weeks:   The patient will demonstrate 4+/5 strength for B shoulder flexion, abduction, external rotation, and internal rotation and grossly 4-/5 strength for scapular retraction/depression in order to demonstrate improved shoulder stability.  STATUS:  New     STG4b:  6 weeks:  The patient will be independent with home exercises.    STATUS:  New         Plan  Therapy options: will be seen for skilled physical therapy services  Planned modality interventions: cryotherapy, electrical stimulation/Russian stimulation, TENS and dry needling  Planned therapy interventions: ADL retraining, balance/weight-bearing training, functional ROM exercises, home exercise program, IADL retraining, joint mobilization, manual therapy, neuromuscular re-education, soft tissue mobilization, strengthening, stretching, therapeutic activities and spinal/joint mobilization  Frequency: 3x week  Duration in weeks: 12  Treatment plan discussed with: patient        Visit Diagnoses:    ICD-10-CM ICD-9-CM   1. Pain, neck  M54.2 723.1   2. Decreased ROM of thoracic spine  M53.84 724.9   3. Weakness of shoulder  R29.898 719.61       Timed:         Manual Therapy:         mins  81339;     Therapeutic Exercise:    10     mins  98914;     Neuromuscular Aliya:        mins  89851;    Therapeutic Activity:          mins  29808;     Gait Training:           mins  53506;     Ultrasound:           mins  27144;    Ionto                                   mins   45051  Self Care                            mins   36536      Un-Timed:  Electrical Stimulation:         mins  80839 ( );  Dry Needling          mins self-pay  Traction          mins 73692  Low Eval     20     Mins  33016  Mod Eval          Mins  46008  High Eval                            Mins  93492  Re-Eval                               mins  50912    Timed Treatment:  30    mins   Total Treatment:     30   mins    PT SIGNATURE: Gertrude Pugh, JULIEN         Initial Certification  Certification Period: 7/14/2021 thru 10/12/2021  I certify that the therapy services are furnished while this patient is under my care.  The services outlined above are required by this patient, and will be reviewed every 90 days.     PHYSICIAN:       DATE:     Please sign and return via fax to [phone] . Thank you, [facility].

## 2021-07-15 VITALS — TEMPERATURE: 98.4 F

## 2021-07-22 DIAGNOSIS — J45.909 ASTHMA, UNSPECIFIED ASTHMA SEVERITY, UNSPECIFIED WHETHER COMPLICATED, UNSPECIFIED WHETHER PERSISTENT: ICD-10-CM

## 2021-07-22 DIAGNOSIS — F31.9 BIPOLAR 1 DISORDER (HCC): Primary | ICD-10-CM

## 2021-07-22 RX ORDER — ALBUTEROL SULFATE 90 UG/1
2 AEROSOL, METERED RESPIRATORY (INHALATION) EVERY 6 HOURS PRN
Qty: 18 G | Refills: 6 | Status: SHIPPED | OUTPATIENT
Start: 2021-07-22 | End: 2021-10-18 | Stop reason: SDUPTHER

## 2021-07-29 DIAGNOSIS — E03.9 HYPOTHYROIDISM, UNSPECIFIED TYPE: Primary | ICD-10-CM

## 2021-07-29 RX ORDER — LEVOTHYROXINE SODIUM 0.05 MG/1
50 TABLET ORAL DAILY
Qty: 90 TABLET | Refills: 1 | Status: SHIPPED | OUTPATIENT
Start: 2021-07-29 | End: 2021-10-18 | Stop reason: SDUPTHER

## 2021-07-30 ENCOUNTER — TELEMEDICINE (OUTPATIENT)
Dept: PSYCHIATRY | Facility: CLINIC | Age: 26
End: 2021-07-30

## 2021-07-30 DIAGNOSIS — F43.10 POST TRAUMATIC STRESS DISORDER (PTSD): ICD-10-CM

## 2021-07-30 DIAGNOSIS — F31.30 BIPOLAR I DISORDER, MOST RECENT EPISODE DEPRESSED (HCC): Primary | ICD-10-CM

## 2021-07-30 DIAGNOSIS — F41.1 GENERALIZED ANXIETY DISORDER: ICD-10-CM

## 2021-07-30 DIAGNOSIS — F51.05 INSOMNIA DUE TO OTHER MENTAL DISORDER: ICD-10-CM

## 2021-07-30 DIAGNOSIS — F99 INSOMNIA DUE TO OTHER MENTAL DISORDER: ICD-10-CM

## 2021-07-30 PROBLEM — F31.9 BIPOLAR 1 DISORDER: Status: ACTIVE | Noted: 2021-07-30

## 2021-07-30 PROBLEM — G43.909 MIGRAINE HEADACHE: Status: ACTIVE | Noted: 2021-07-30

## 2021-07-30 PROBLEM — F17.200 NICOTINE DEPENDENCE: Status: ACTIVE | Noted: 2021-04-08

## 2021-07-30 PROBLEM — E07.9 THYROID DISORDER: Status: ACTIVE | Noted: 2021-07-30

## 2021-07-30 PROBLEM — J45.909 ASTHMA: Status: ACTIVE | Noted: 2021-07-30

## 2021-07-30 PROBLEM — Z88.9 PREDISPOSITION TO ALLERGIC REACTION: Status: ACTIVE | Noted: 2021-07-30

## 2021-07-30 PROBLEM — F41.9 ANXIETY: Status: ACTIVE | Noted: 2021-07-30

## 2021-07-30 PROBLEM — F32.A DEPRESSION: Status: ACTIVE | Noted: 2021-07-30

## 2021-07-30 PROBLEM — E06.3 HASHIMOTO'S THYROIDITIS: Status: ACTIVE | Noted: 2021-07-30

## 2021-07-30 PROBLEM — R23.8 FACIAL AGING: Status: ACTIVE | Noted: 2021-07-30

## 2021-07-30 PROBLEM — E03.9 HYPOTHYROIDISM: Status: ACTIVE | Noted: 2021-07-30

## 2021-07-30 PROCEDURE — 90792 PSYCH DIAG EVAL W/MED SRVCS: CPT | Performed by: NURSE PRACTITIONER

## 2021-07-30 RX ORDER — CYCLOBENZAPRINE HCL 5 MG
TABLET ORAL
COMMUNITY
Start: 2021-04-13 | End: 2023-01-03

## 2021-07-30 RX ORDER — ARIPIPRAZOLE 2 MG/1
TABLET ORAL
COMMUNITY
Start: 2021-06-15 | End: 2021-07-30

## 2021-07-30 RX ORDER — FEXOFENADINE HCL 180 MG/1
TABLET ORAL
COMMUNITY
End: 2022-03-30

## 2021-07-30 RX ORDER — ACETAMINOPHEN AND CODEINE PHOSPHATE 120; 12 MG/5ML; MG/5ML
SOLUTION ORAL
COMMUNITY
Start: 2021-07-18 | End: 2021-11-03

## 2021-07-30 RX ORDER — VITAMIN A ACETATE, BETA CAROTENE, ASCORBIC ACID, CHOLECALCIFEROL, .ALPHA.-TOCOPHEROL ACETATE, DL-, THIAMINE MONONITRATE, RIBOFLAVIN, NIACINAMIDE, PYRIDOXINE HYDROCHLORIDE, FOLIC ACID, CYANOCOBALAMIN, CALCIUM CARBONATE, FERROUS FUMARATE, ZINC OXIDE, CUPRIC OXIDE 3080; 12; 120; 400; 1; 1.84; 3; 20; 22; 920; 25; 200; 27; 10; 2 [IU]/1; UG/1; MG/1; [IU]/1; MG/1; MG/1; MG/1; MG/1; MG/1; [IU]/1; MG/1; MG/1; MG/1; MG/1; MG/1
1 TABLET, FILM COATED ORAL DAILY
COMMUNITY
End: 2021-08-30

## 2021-07-30 RX ORDER — CALCIUM CARBONATE 200(500)MG
TABLET,CHEWABLE ORAL
COMMUNITY
End: 2021-08-30

## 2021-07-30 RX ORDER — NITROFURANTOIN 25; 75 MG/1; MG/1
CAPSULE ORAL
COMMUNITY
Start: 2021-05-26 | End: 2021-08-30

## 2021-07-30 RX ORDER — DESVENLAFAXINE SUCCINATE 50 MG/1
50 TABLET, EXTENDED RELEASE ORAL DAILY
Qty: 90 TABLET | Refills: 0 | Status: SHIPPED | OUTPATIENT
Start: 2021-07-30 | End: 2021-11-01

## 2021-07-30 RX ORDER — QUETIAPINE FUMARATE 25 MG/1
25 TABLET, FILM COATED ORAL NIGHTLY
Qty: 90 TABLET | Refills: 0 | Status: SHIPPED | OUTPATIENT
Start: 2021-07-30 | End: 2021-11-01

## 2021-07-30 RX ORDER — TRAZODONE HYDROCHLORIDE 50 MG/1
TABLET ORAL
COMMUNITY
Start: 2020-12-07 | End: 2021-08-30

## 2021-07-30 RX ORDER — DESVENLAFAXINE SUCCINATE 50 MG/1
TABLET, EXTENDED RELEASE ORAL
COMMUNITY
Start: 2021-07-03 | End: 2021-07-30 | Stop reason: SDUPTHER
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
## 2021-08-04 ENCOUNTER — DOCUMENTATION (OUTPATIENT)
Dept: PHYSICAL THERAPY | Facility: CLINIC | Age: 26
End: 2021-08-04

## 2021-08-04 DIAGNOSIS — M53.84 DECREASED ROM OF THORACIC SPINE: ICD-10-CM

## 2021-08-04 DIAGNOSIS — R29.898 WEAKNESS OF SHOULDER: ICD-10-CM

## 2021-08-04 DIAGNOSIS — M54.2 PAIN, NECK: Primary | ICD-10-CM

## 2021-08-04 NOTE — PROGRESS NOTES
Discharge Summary  Discharge Summary from Physical Therapy Report    Patient Information  Maurice Lazo  1995    Dates  PT visit:  Number of Visits: 1    Discharge Status of Patient: pt canceled appts due to insurance issues  Goals  Plan Goals: 1. The patient has complaints of pain.     LTG 1: 12 weeks:  The patient will report 1/10 pain in order to more easily tolerate activities of daily living and improve sleep quality.   STATUS:  Not met     STG 2a: 6 weeks:  The patient will report 2/10 pain.   STATUS:  Not met        2. The patient reports radicular symptoms in the L upper extremity.     LTG 3: 12 weeks:  The patient will report a decrease in radicular symptoms in the L upper extremity by 100%.   STATUS:  New     STG 3a: 6 weeks:  The patient will report a decrease in radicular symptoms in the L upper extremity by 50%.   STATUS:  Not met      3. Carrying, Moving, and Handling Objects Functional Limitation                      LTG 3: 12 weeks:  The patient will demonstrate 1-19% limitation by achieving a score of 3 on the Neck Disability Index.   STATUS:  Not met     STG 3a: 6 weeks:  The patient will demonstrate 1-19% limitation by achieving a score of 5 on the Neck Disability Index.     STATUS:  Not met        4. The patient has limited strength of the B shoulder.     LTG 4: 12 weeks:  The patient will demonstrate 5/5 strength for B shoulder flexion, abduction, external rotation, and internal rotation and grossly 4+/5 strength for scapular retraction/depression in order to demonstrate improved shoulder stability.   STATUS:  Not met    STG 4a: 6 weeks:   The patient will demonstrate 4+/5 strength for B shoulder flexion, abduction, external rotation, and internal rotation and grossly 4-/5 strength for scapular retraction/depression in order to demonstrate improved shoulder stability.  STATUS:  Not met    STG4b:  6 weeks:  The patient will be independent with home exercises.    STATUS:  Not met  Goals: Not  Met    Visit Diagnoses:    ICD-10-CM ICD-9-CM   1. Pain, neck  M54.2 723.1   2. Decreased ROM of thoracic spine  M53.84 724.9   3. Weakness of shoulder  R29.898 719.61       Discharge Plan: None, pt called after eval to cancel appts due to insurance        Date of Discharge 8/4/21        Gertrude Pugh, PT  Physical Therapist

## 2021-08-30 ENCOUNTER — OFFICE VISIT (OUTPATIENT)
Dept: FAMILY MEDICINE CLINIC | Facility: CLINIC | Age: 26
End: 2021-08-30

## 2021-08-30 VITALS
HEIGHT: 70 IN | DIASTOLIC BLOOD PRESSURE: 75 MMHG | OXYGEN SATURATION: 99 % | WEIGHT: 142.6 LBS | HEART RATE: 110 BPM | BODY MASS INDEX: 20.41 KG/M2 | SYSTOLIC BLOOD PRESSURE: 132 MMHG

## 2021-08-30 DIAGNOSIS — Z13.220 SCREENING FOR LIPID DISORDERS: ICD-10-CM

## 2021-08-30 DIAGNOSIS — R00.0 TACHYCARDIA: Chronic | ICD-10-CM

## 2021-08-30 DIAGNOSIS — G43.109 MIGRAINE WITH AURA AND WITHOUT STATUS MIGRAINOSUS, NOT INTRACTABLE: Primary | Chronic | ICD-10-CM

## 2021-08-30 DIAGNOSIS — Z11.59 ENCOUNTER FOR HEPATITIS C SCREENING TEST FOR LOW RISK PATIENT: ICD-10-CM

## 2021-08-30 DIAGNOSIS — E06.3 HASHIMOTO'S THYROIDITIS: Chronic | ICD-10-CM

## 2021-08-30 PROCEDURE — 99214 OFFICE O/P EST MOD 30 MIN: CPT | Performed by: PHYSICIAN ASSISTANT

## 2021-08-30 RX ORDER — PROPRANOLOL HYDROCHLORIDE 20 MG/1
20 TABLET ORAL 3 TIMES DAILY
Qty: 60 TABLET | Refills: 5 | Status: SHIPPED | OUTPATIENT
Start: 2021-08-30 | End: 2021-10-18 | Stop reason: SDUPTHER

## 2021-08-30 RX ORDER — SUMATRIPTAN 100 MG/1
TABLET, FILM COATED ORAL
Qty: 9 TABLET | Refills: 5 | Status: SHIPPED | OUTPATIENT
Start: 2021-08-30 | End: 2022-02-28

## 2021-08-30 NOTE — PROGRESS NOTES
Chief Complaint  Hypothyroidism (5 month follow up) and Headache    Subjective          Maurice Lazo presents to [facility]  History of Present Illness  Pt presents today for 5 month follow up.    Pt states she would like to discuss getting something started for her migraines. Pt has a h/o of migraines and has been having 3-4 per month. Pt has frequent headaches.    Pt would like to have her labs done due to changing of her insurance. Pts thyroid is due to be checked.    Pts heart rate is constantly high; today in office is at 110 at rest.    Labs 12/9/20  Pap [facility]/20[facility]      Current Outpatient Medications:   •  albuterol sulfate  (90 Base) MCG/ACT inhaler, Inhale 2 puffs Every 6 (Six) Hours As Needed for Wheezing., Disp: 18 g, Rfl: 6  •  cetirizine (zyrTEC) 5 MG tablet, Take 10 mg by mouth Daily., Disp: , Rfl:   •  cholecalciferol (VITAMIN D3) 1.25 MG (17735 UT) capsule, Take 1 capsule by mouth 1 (One) Time Per Week., Disp: 13 capsule, Rfl: 3  •  desvenlafaxine (PRISTIQ) 50 MG 24 hr tablet, Take 1 tablet by mouth Daily for 90 days., Disp: 90 tablet, Rfl: 0  •  fexofenadine (Allegra Allergy) 180 MG tablet, Allegra Allergy 180 mg oral tablet take 1 tablet (180 mg) by oral route once daily   Active, Disp: , Rfl:   •  levothyroxine (SYNTHROID, LEVOTHROID) 50 MCG tablet, Take 1 tablet by mouth Daily., Disp: 90 tablet, Rfl: 1  •  montelukast (SINGULAIR) 10 MG tablet, TAKE ONE TABLET BY MOUTH DAILY IN THE EVENING, Disp: 90 tablet, Rfl: 1  •  norethindrone (MICRONOR) 0.35 MG tablet, , Disp: , Rfl:   •  QUEtiapine (SEROquel) 25 MG tablet, Take 1 tablet by mouth Every Night for 90 days., Disp: 90 tablet, Rfl: 0  •  cyclobenzaprine (FLEXERIL) 5 MG tablet, cyclobenzaprine 5 mg oral tablet take 1 tablet (5 mg) by oral route BID prn 4/13/2021  Active, Disp: , Rfl:   •  propranolol (INDERAL) 20 MG tablet, Take 1 tablet by mouth 3 (Three) Times a Day., Disp: 60 tablet, Rfl: 5  •   "SUMAtriptan (Imitrex) 100 MG tablet, Take one tablet at onset of headache. May repeat dose one time in 2 hours if headache not relieved., Disp: 9 tablet, Rfl: 5    Objective      Vital Signs:   /75 (BP Location: Left arm)   Pulse 110   Ht 176.5 cm (69.5\")   Wt 64.7 kg (142 lb 9.6 oz)   SpO2 99%   BMI 20.76 kg/m²    Estimated body mass index is 20.76 kg/m² as calculated from the following:    Height as of this encounter: 176.5 cm (69.5\").    Weight as of this encounter: 64.7 kg (142 lb 9.6 oz).     Physical Exam  Vitals and nursing note reviewed.   Constitutional:       Appearance: Normal appearance.   HENT:      Head: Normocephalic and atraumatic.   Cardiovascular:      Rate and Rhythm: Normal rate and regular rhythm.   Pulmonary:      Effort: Pulmonary effort is normal.      Breath sounds: Normal breath sounds.   Musculoskeletal:      Cervical back: Neck supple.   Neurological:      Mental Status: She is alert.   Psychiatric:         Mood and Affect: Mood normal.         Behavior: Behavior normal.        Result Review :     Common labs    Common Labsle 12/9/20   Glucose 89   BUN 8   Creatinine 0.86   Sodium 139   Potassium 3.9   Chloride 105   Calcium 9.3   Albumin 4.5   Total Bilirubin 0.33   Alkaline Phosphatase 58   AST (SGOT) 13 (A)   ALT (SGPT) 10   (A) Abnormal value                        Assessment and Plan      Diagnoses and all orders for this visit:    1. Migraine with aura and without status migrainosus, not intractable (Primary)  Comments:  Poor control, we will start imitrex and low beta blocker   Orders:  -     CBC & Differential; Future  -     Comprehensive Metabolic Panel; Future  -     SUMAtriptan (Imitrex) 100 MG tablet; Take one tablet at onset of headache. May repeat dose one time in 2 hours if headache not relieved.  Dispense: 9 tablet; Refill: 5  -     propranolol (INDERAL) 20 MG tablet; Take 1 tablet by mouth 3 (Three) Times a Day.  Dispense: 60 tablet; Refill: 5    2. Hashimoto's " thyroiditis  Comments:  Stable with synthroid 50mcg daily  Orders:  -     Comprehensive Metabolic Panel; Future  -     TSH Rfx On Abnormal To Free T4; Future    3. Tachycardia  Comments:  Poorly controlled we will begin low dose beta blocker  Orders:  -     CBC & Differential; Future  -     Comprehensive Metabolic Panel; Future  -     Vitamin D 25 Hydroxy; Future  -     Urinalysis With Microscopic If Indicated (No Culture) - Urine, Clean Catch; Future  -     propranolol (INDERAL) 20 MG tablet; Take 1 tablet by mouth 3 (Three) Times a Day.  Dispense: 60 tablet; Refill: 5    4. Screening for lipid disorders  -     Lipid Panel; Future    5. Encounter for hepatitis C screening test for low risk patient  -     Hepatitis C antibody; Future        Pt quit smoking.  She was congratulated.    Follow Up     Return in about 6 months (around 2/28/2022).    I have reviewed information obtained and documented by others and I have confirmed the accuracy of this documented note.     Patient was given instructions and counseling regarding her condition or for health maintenance advice. Please see specific information pulled into the AVS if appropriate.     VALERIE Matthews

## 2021-09-01 ENCOUNTER — TELEMEDICINE (OUTPATIENT)
Dept: PSYCHIATRY | Facility: CLINIC | Age: 26
End: 2021-09-01

## 2021-09-01 DIAGNOSIS — F99 INSOMNIA DUE TO OTHER MENTAL DISORDER: ICD-10-CM

## 2021-09-01 DIAGNOSIS — F43.10 POST TRAUMATIC STRESS DISORDER (PTSD): ICD-10-CM

## 2021-09-01 DIAGNOSIS — F51.05 INSOMNIA DUE TO OTHER MENTAL DISORDER: ICD-10-CM

## 2021-09-01 DIAGNOSIS — F31.30 BIPOLAR I DISORDER, MOST RECENT EPISODE DEPRESSED (HCC): Primary | ICD-10-CM

## 2021-09-01 DIAGNOSIS — F41.1 GENERALIZED ANXIETY DISORDER: ICD-10-CM

## 2021-09-01 PROCEDURE — 99214 OFFICE O/P EST MOD 30 MIN: CPT | Performed by: NURSE PRACTITIONER

## 2021-09-01 NOTE — PROGRESS NOTES
Subjective   Maurice Lazo is a 25 y.o. female who presents today for follow up. Virtual visit via Zoom audio and video due to the COVID-19 pandemic.  Patient is accepting of and agreeable to appointment.  The appointment consisted of the patient and I only.      Chief Complaint:  Bipolar Disorder, anxiety, PTSD    History of Present Illness: Patient reports improvement in mood over the past month since starting on quetiapine.  Patient reports less intrusive thoughts.  Depression has improved.  More energy.  Sleeping better.  Denies hopelessness or suicidal thoughts.  Anxiety is decreased, less worries.  Anxiety has been more manageable for her.  Less physical symptoms of anxiety.      Denies symptoms of kiersten or psychosis.        Access to Firearms: Denies    Past Surgical History:  Past Surgical History:   Procedure Laterality Date   • DILATATION AND CURETTAGE     • OTHER SURGICAL HISTORY      Surgical Clips   • ROTATOR CUFF REPAIR     • SHOULDER SURGERY     • WISDOM TOOTH EXTRACTION         Problem List:  Patient Active Problem List   Diagnosis   • Facial aging   • Anxiety   • Asthma   • Bipolar 1 disorder (CMS/HCC)   • Depression   • Hashimoto's thyroiditis   • Migraine headache   • Nicotine dependence   • Predisposition to allergic reaction   • Hypothyroidism   • Thyroid disorder   • Facial aging       Allergy:   Allergies   Allergen Reactions   • Meperidine Hives and Nausea And Vomiting   • Lamictal [Lamotrigine] Rash        Discontinued Medications:  There are no discontinued medications.    Current Medications:   Current Outpatient Medications   Medication Sig Dispense Refill   • albuterol sulfate  (90 Base) MCG/ACT inhaler Inhale 2 puffs Every 6 (Six) Hours As Needed for Wheezing. 18 g 6   • cetirizine (zyrTEC) 5 MG tablet Take 10 mg by mouth Daily.     • cholecalciferol (VITAMIN D3) 1.25 MG (81812 UT) capsule Take 1 capsule by mouth 1 (One) Time Per Week. 13 capsule 3   • cyclobenzaprine (FLEXERIL)  5 MG tablet cyclobenzaprine 5 mg oral tablet take 1 tablet (5 mg) by oral route BID prn 4/13/2021  Active     • desvenlafaxine (PRISTIQ) 50 MG 24 hr tablet Take 1 tablet by mouth Daily for 90 days. 90 tablet 0   • fexofenadine (Allegra Allergy) 180 MG tablet Allegra Allergy 180 mg oral tablet take 1 tablet (180 mg) by oral route once daily   Active     • levothyroxine (SYNTHROID, LEVOTHROID) 50 MCG tablet Take 1 tablet by mouth Daily. 90 tablet 1   • montelukast (SINGULAIR) 10 MG tablet TAKE ONE TABLET BY MOUTH DAILY IN THE EVENING 90 tablet 1   • norethindrone (MICRONOR) 0.35 MG tablet      • propranolol (INDERAL) 20 MG tablet Take 1 tablet by mouth 3 (Three) Times a Day. 60 tablet 5   • QUEtiapine (SEROquel) 25 MG tablet Take 1 tablet by mouth Every Night for 90 days. 90 tablet 0   • SUMAtriptan (Imitrex) 100 MG tablet Take one tablet at onset of headache. May repeat dose one time in 2 hours if headache not relieved. 9 tablet 5     No current facility-administered medications for this visit.       Past Medical History:  Past Medical History:   Diagnosis Date   • Allergy    • Anxiety    • Asthma    • Bipolar 1 disorder (CMS/[facility])    • Depression    • Facial aging    • Gastroesophageal reflux disease    • Migraine headache    • Nicotine dependence 04/08/2021   • Thyroid disorder        Past Psychiatric History:  Medication Trials: Patient reports she has been on Abilify, Rexulti and Vraylar for mood, with no benefits.  Patient reports she has been on pro-Adriel, Zoloft and Trintellix for depression and anxiety with little benefit.    Substance Abuse History:   Types: Denies  Withdrawal Symptoms: Denies  Longest Period Sober: Denies  AA: Denies      Social History     Socioeconomic History   • Marital status:      Spouse name: Not on file   • Number of children: Not on file   • Years of education: Not on file   • Highest education level: Not on file   Tobacco Use   • Smoking status: Former Smoker     Packs/day:  "0.50     Years: 2.00     Pack years: 1.00   • Smokeless tobacco: Never Used   • Tobacco comment: former, 1 packs per day, smoked for 6-10 years   Vaping Use   • Vaping Use: Never used   Substance and Sexual Activity   • Alcohol use: Yes     Comment: Current some day  rarely drinks   • Drug use: Never         Family History   Problem Relation Age of Onset   • Stroke Mother    • Lung cancer Maternal Grandmother    • Heart disease Other    • Anxiety disorder Father    • Bipolar disorder Paternal Aunt    • Schizophrenia Paternal Aunt        Mental Status Exam:     Appearance: good eye contact, normal street clothes, groomed, sitting in chair   Behavior: pleasant and cooperative  Motor: no abnormal  Speech: normal rhythm, rate, volume, tone, not hyperverbal, not pressured, normal prosidy  Mood: \"Good\"  Affect: euthymic  Thought Content: negative suicidal ideations, negative homicidal ideations, negative obsessions  Perceptions: negative auditory hallucinations, negative visual hallucinations, negative delusions, negative paranoia  Thought Process: goal directed, linear  Insight/Judgement: fair/fair  Cognition: grossly intact  Attention: intact  Orientation: person, place, time and situation  Memory: intact    Review of Systems:     Constitutional: Denies fatigue, night sweats  Eyes: Denies double vision, blurred vision  HENT: Denies vertigo, recent head injury  Cardiovascular: Denies chest pain, irregular heartbeats  Respiratory: Denies productive cough, shortness of breath  Gastrointestinal: Denies nausea, vomiting  Genitourinary: Denies dysuria, urinary retention  Integument: Denies hair growth change, new skin lesions  Neurologic: Denies altered mental status, seizures  Musculoskeletal: Denies joint swelling, limitation of motion  Endocrine: Denies cold intolerance, heat intolerance  Psychiatric: Admits anxiety, depression, PTSD. Denies kiersten, obsessive compulsive disorder, psychosis.   Allergic-immunologic: Denies " frequent illnesses      Vital Signs:   There were no vitals taken for this visit.     Lab Results:   Abstract on 05/27/2021   Component Date Value Ref Range Status   • HM EYE EXAM 03/23/2021 SEE REPORT   Final   • HM EYE EXAM 03/23/2021 SEE REPORT   Final    SEE REPORT   Conversion Encounter on 05/26/2021   Component Date Value Ref Range Status   • Leukocytes, UA 05/26/2021 Trace   Final   • Nitrite, UA 05/26/2021 Negative   Final   • Urobilinogen, UA 05/26/2021 0.2 E.U./dL   Final   • Protein, UA 05/26/2021 Trace   Final   • pH, UA 05/26/2021 5.5   Final   • Blood, UA 05/26/2021 Large   Final   • Specific Gravity, UA 05/26/2021 1.030   Final   • Ketones, UA 05/26/2021 Negative   Final   • Bilirubin, UA 05/26/2021 Negative   Final   • Glucose, UA 05/26/2021 Negative   Final   • Appearance 05/26/2021 Clear   Final   • Color, UA 05/26/2021 Yellow   Final       EKG Results:  No orders to display       Imaging Results:  No Images in the past 120 days found..      Assessment/Plan   Diagnoses and all orders for this visit:    1. Bipolar I disorder, most recent episode depressed (CMS/[facility]) (Primary)    2. Generalized anxiety disorder    3. Insomnia due to other mental disorder    4. Post traumatic stress disorder (PTSD)       Patient presents today with improvement in depression and anxiety over the past month, since starting on quetiapine.  We will continue quetiapine to target mood and insomnia.  Continue desvenlafaxine to target depression and anxiety.  Continue trazodone to target insomnia. Denies symptoms of kiersten.     5 minutes of supportive psychotherapy with goal to strengthen defenses, promote problems solving, restore adaptive functioning and provide symptom relief. The therapeutic alliance was strengthened to encourage the patient to express their thoughts and feelings. Esteem building was enhanced through praise, reassurance, normalizing and encouragement. Coping skills were enhanced to build distress tolerance  skills and emotional regulation. Patient given education on medication side effects, diagnosis/illness and relapse symptoms. Plan to continue supportive psychotherapy in next appointment to provide symptom relief.     2 month follow up    Visit Diagnoses:    ICD-10-CM ICD-9-CM   1. Bipolar I disorder, most recent episode depressed (CMS/HCC)  F31.30 296.50   2. Generalized anxiety disorder  F41.1 300.02   3. Insomnia due to other mental disorder  F51.05 300.9    F99 327.02   4. Post traumatic stress disorder (PTSD)  F43.10 309.81       PLAN:  1. Safety: No acute safety concerns.   2. Therapy: Declines  3. Risk Assessment: Risk of self-harm acutely is moderate.  Risk factors include anxiety disorder, mood disorder, history of self harm and recent psychosocial stressors (pandemic). Protective factors include no family history, denies access to guns/weapons, no present SI, no history of suicide attempts, minimal AODA, healthcare seeking, future orientation, willingness to engage in care.  Risk of self-harm chronically is also moderate, but could be further elevated in the event of treatment noncompliance and/or AODA.  4. Medications: Continue desvenlafaxine 50 mg p.o. daily to target depression and anxiety.  Discussed all risks, benefits, alternatives, and side effects of Pristiq including but not limited to GI symptoms (N/V/D, constipation), sexual dysfunction, dizziness, insomnia, hyperhidrosis, anxiety, bleeding risk, seizure risk, CNS depression, dyslipidemia, activation of kiersten or hypomania, increased fragility fracture risk, hyponatremia, ocular effects, HTN, withdrawal syndrome following abrupt discontinuation, serotonin syndrome, and activation of suicidal ideation and behavior.  Discussed the need for pt to immediately call the office for any new or worsening symptoms, such as worsening depression; feeling nervous or restless; suicidal thoughts or actions; or other changes changes in mood or behavior, and all  other concerns. Pt educated on med compliance and the risks of suddenly stopping this medication or missing doses. Pt verbalized understanding and is agreeable to taking Pristiq. Addressed all questions and concerns. CONTINUE quetiapine 25 mg p.o. nightly to target mood and insomnia.  Risks, benefits, alternatives discussed with patient including nausea and vomiting, GI upset, sedation, akathisia, hypotension, increased appetite, lowering of seizure threshold, theoretical risk of tardive dyskinesia, extrapyramidal symptoms, restless legs syndrome. After discussion of these risks and benefits, the patient voiced understanding and agreed to proceed. Continue trazodone 50 mg p.o. nightly to target insomnia. Risks, benefits, side effects discussed with patient including GI upset, sedation, dizziness/falls risk, grogginess the following day, prolongation of the QTc interval.  After discussion of these risks and benefits, the patient voiced understanding and agreed to proceed.    5. Labs/studies: No labs/studies ordered at this time  6. Follow-up: 2 months      TREATMENT PLAN/GOALS: Continue supportive psychotherapy efforts and medications as indicated. Treatment and medication options discussed during today's visit. Patient ackowledged and verbally consented to continue with current treatment plan and was educated on the importance of compliance with treatment and follow-up appointments.    MEDICATION ISSUES:  RODNEY reviewed as expected.  Discussed medication options and treatment plan of prescribed medication as well as the risks, benefits, and side effects including potential falls, possible impaired driving and metabolic adversities among others. Patient is agreeable to call the office with any worsening of symptoms or onset of side effects. Patient is agreeable to call 911 or go to the nearest ER should he/she begin having SI/HI. No medication side effects or related complaints today.     MEDS ORDERED DURING  VISIT:  No orders of the defined types were placed in this encounter.    No refills needed currently     2 month follow up       This document has been electronically signed by LACHELLE Conde  September 1, 2021 08:19 EDT      Part of this note may be an electronic transcription/translation of spoken language to printed text using the Dragon Dictation System.

## 2021-09-09 ENCOUNTER — LAB (OUTPATIENT)
Dept: LAB | Facility: HOSPITAL | Age: 26
End: 2021-09-09

## 2021-09-09 DIAGNOSIS — Z13.220 SCREENING FOR LIPID DISORDERS: ICD-10-CM

## 2021-09-09 DIAGNOSIS — G43.109 MIGRAINE WITH AURA AND WITHOUT STATUS MIGRAINOSUS, NOT INTRACTABLE: ICD-10-CM

## 2021-09-09 DIAGNOSIS — Z11.59 ENCOUNTER FOR HEPATITIS C SCREENING TEST FOR LOW RISK PATIENT: ICD-10-CM

## 2021-09-09 DIAGNOSIS — E06.3 HASHIMOTO'S THYROIDITIS: Chronic | ICD-10-CM

## 2021-09-09 DIAGNOSIS — R00.0 TACHYCARDIA: ICD-10-CM

## 2021-09-09 LAB
25(OH)D3 SERPL-MCNC: 47.4 NG/ML (ref 30–100)
ALBUMIN SERPL-MCNC: 4.5 G/DL (ref 3.5–5.2)
ALBUMIN/GLOB SERPL: 2 G/DL
ALP SERPL-CCNC: 47 U/L (ref 39–117)
ALT SERPL W P-5'-P-CCNC: 8 U/L (ref 1–33)
ANION GAP SERPL CALCULATED.3IONS-SCNC: 11 MMOL/L (ref 5–15)
AST SERPL-CCNC: 11 U/L (ref 1–32)
BASOPHILS # BLD AUTO: 0.05 10*3/MM3 (ref 0–0.2)
BASOPHILS NFR BLD AUTO: 0.7 % (ref 0–1.5)
BILIRUB SERPL-MCNC: 0.3 MG/DL (ref 0–1.2)
BUN SERPL-MCNC: 9 MG/DL (ref 6–20)
BUN/CREAT SERPL: 12.5 (ref 7–25)
CALCIUM SPEC-SCNC: 9.6 MG/DL (ref 8.6–10.5)
CHLORIDE SERPL-SCNC: 107 MMOL/L (ref 98–107)
CHOLEST SERPL-MCNC: 143 MG/DL (ref 0–200)
CO2 SERPL-SCNC: 21 MMOL/L (ref 22–29)
CREAT SERPL-MCNC: 0.72 MG/DL (ref 0.57–1)
DEPRECATED RDW RBC AUTO: 40.8 FL (ref 37–54)
EOSINOPHIL # BLD AUTO: 0.25 10*3/MM3 (ref 0–0.4)
EOSINOPHIL NFR BLD AUTO: 3.5 % (ref 0.3–6.2)
ERYTHROCYTE [DISTWIDTH] IN BLOOD BY AUTOMATED COUNT: 12.1 % (ref 12.3–15.4)
GFR SERPL CREATININE-BSD FRML MDRD: 98 ML/MIN/1.73
GLOBULIN UR ELPH-MCNC: 2.3 GM/DL
GLUCOSE SERPL-MCNC: 85 MG/DL (ref 65–99)
HCT VFR BLD AUTO: 40.1 % (ref 34–46.6)
HCV AB SER DONR QL: NORMAL
HDLC SERPL-MCNC: 45 MG/DL (ref 40–60)
HGB BLD-MCNC: 13.5 G/DL (ref 12–15.9)
IMM GRANULOCYTES # BLD AUTO: 0.02 10*3/MM3 (ref 0–0.05)
IMM GRANULOCYTES NFR BLD AUTO: 0.3 % (ref 0–0.5)
LDLC SERPL CALC-MCNC: 81 MG/DL (ref 0–100)
LDLC/HDLC SERPL: 1.78 {RATIO}
LYMPHOCYTES # BLD AUTO: 2.56 10*3/MM3 (ref 0.7–3.1)
LYMPHOCYTES NFR BLD AUTO: 35.5 % (ref 19.6–45.3)
MCH RBC QN AUTO: 31 PG (ref 26.6–33)
MCHC RBC AUTO-ENTMCNC: 33.7 G/DL (ref 31.5–35.7)
MCV RBC AUTO: 92.2 FL (ref 79–97)
MONOCYTES # BLD AUTO: 0.44 10*3/MM3 (ref 0.1–0.9)
MONOCYTES NFR BLD AUTO: 6.1 % (ref 5–12)
NEUTROPHILS NFR BLD AUTO: 3.89 10*3/MM3 (ref 1.7–7)
NEUTROPHILS NFR BLD AUTO: 53.9 % (ref 42.7–76)
NRBC BLD AUTO-RTO: 0 /100 WBC (ref 0–0.2)
PLATELET # BLD AUTO: 275 10*3/MM3 (ref 140–450)
PMV BLD AUTO: 10.9 FL (ref 6–12)
POTASSIUM SERPL-SCNC: 4.1 MMOL/L (ref 3.5–5.2)
PROT SERPL-MCNC: 6.8 G/DL (ref 6–8.5)
RBC # BLD AUTO: 4.35 10*6/MM3 (ref 3.77–5.28)
SODIUM SERPL-SCNC: 139 MMOL/L (ref 136–145)
TRIGL SERPL-MCNC: 89 MG/DL (ref 0–150)
TSH SERPL DL<=0.05 MIU/L-ACNC: 2.85 UIU/ML (ref 0.27–4.2)
VLDLC SERPL-MCNC: 17 MG/DL (ref 5–40)
WBC # BLD AUTO: 7.21 10*3/MM3 (ref 3.4–10.8)

## 2021-09-09 PROCEDURE — 80061 LIPID PANEL: CPT

## 2021-09-09 PROCEDURE — 80053 COMPREHEN METABOLIC PANEL: CPT

## 2021-09-09 PROCEDURE — 85025 COMPLETE CBC W/AUTO DIFF WBC: CPT

## 2021-09-09 PROCEDURE — 36415 COLL VENOUS BLD VENIPUNCTURE: CPT

## 2021-09-09 PROCEDURE — 82306 VITAMIN D 25 HYDROXY: CPT

## 2021-09-09 PROCEDURE — 86803 HEPATITIS C AB TEST: CPT

## 2021-09-09 PROCEDURE — 84443 ASSAY THYROID STIM HORMONE: CPT

## 2021-09-28 ENCOUNTER — TELEPHONE (OUTPATIENT)
Dept: FAMILY MEDICINE CLINIC | Facility: CLINIC | Age: 26
End: 2021-09-28

## 2021-09-30 RX ORDER — MEDROXYPROGESTERONE ACETATE 150 MG/ML
150 INJECTION, SUSPENSION INTRAMUSCULAR
Qty: 1 ML | Refills: 3 | Status: SHIPPED | OUTPATIENT
Start: 2021-09-30 | End: 2021-10-01 | Stop reason: SDUPTHER

## 2021-09-30 RX ORDER — MEDROXYPROGESTERONE ACETATE 150 MG/ML
150 INJECTION, SUSPENSION INTRAMUSCULAR
Qty: 1 ML | Refills: 3 | Status: SHIPPED | OUTPATIENT
Start: 2021-09-30 | End: 2021-09-30

## 2021-10-01 DIAGNOSIS — Z30.9 ENCOUNTER FOR CONTRACEPTIVE MANAGEMENT, UNSPECIFIED TYPE: Primary | ICD-10-CM

## 2021-10-01 RX ORDER — MEDROXYPROGESTERONE ACETATE 150 MG/ML
150 INJECTION, SUSPENSION INTRAMUSCULAR
Qty: 1 ML | Refills: 3 | Status: SHIPPED | OUTPATIENT
Start: 2021-10-01 | End: 2021-12-16 | Stop reason: SDUPTHER

## 2021-10-04 DIAGNOSIS — Z30.9 ENCOUNTER FOR CONTRACEPTIVE MANAGEMENT, UNSPECIFIED TYPE: Primary | ICD-10-CM

## 2021-10-12 DIAGNOSIS — J30.9 ALLERGIC RHINITIS, UNSPECIFIED SEASONALITY, UNSPECIFIED TRIGGER: Primary | ICD-10-CM

## 2021-10-12 RX ORDER — MONTELUKAST SODIUM 10 MG/1
10 TABLET ORAL EVERY EVENING
Qty: 90 TABLET | Refills: 3 | Status: SHIPPED | OUTPATIENT
Start: 2021-10-12 | End: 2022-07-26 | Stop reason: SDUPTHER

## 2021-10-18 DIAGNOSIS — R00.0 TACHYCARDIA: Chronic | ICD-10-CM

## 2021-10-18 DIAGNOSIS — E03.9 HYPOTHYROIDISM, UNSPECIFIED TYPE: ICD-10-CM

## 2021-10-18 DIAGNOSIS — J45.909 ASTHMA, UNSPECIFIED ASTHMA SEVERITY, UNSPECIFIED WHETHER COMPLICATED, UNSPECIFIED WHETHER PERSISTENT: ICD-10-CM

## 2021-10-18 DIAGNOSIS — G43.109 MIGRAINE WITH AURA AND WITHOUT STATUS MIGRAINOSUS, NOT INTRACTABLE: Chronic | ICD-10-CM

## 2021-10-18 RX ORDER — ALBUTEROL SULFATE 90 UG/1
2 AEROSOL, METERED RESPIRATORY (INHALATION) EVERY 6 HOURS PRN
Qty: 18 G | Refills: 6 | Status: SHIPPED | OUTPATIENT
Start: 2021-10-18 | End: 2022-07-11 | Stop reason: SDUPTHER

## 2021-10-18 RX ORDER — PROPRANOLOL HYDROCHLORIDE 20 MG/1
20 TABLET ORAL 3 TIMES DAILY
Qty: 60 TABLET | Refills: 5 | Status: SHIPPED | OUTPATIENT
Start: 2021-10-18 | End: 2022-07-11 | Stop reason: SDUPTHER

## 2021-10-18 RX ORDER — LEVOTHYROXINE SODIUM 0.05 MG/1
50 TABLET ORAL DAILY
Qty: 90 TABLET | Refills: 1 | Status: SHIPPED | OUTPATIENT
Start: 2021-10-18 | End: 2022-04-25 | Stop reason: SDUPTHER

## 2021-10-19 ENCOUNTER — CLINICAL SUPPORT (OUTPATIENT)
Dept: FAMILY MEDICINE CLINIC | Facility: CLINIC | Age: 26
End: 2021-10-19

## 2021-10-19 DIAGNOSIS — Z23 FLU VACCINE NEED: Primary | ICD-10-CM

## 2021-10-19 PROCEDURE — 90471 IMMUNIZATION ADMIN: CPT | Performed by: PHYSICIAN ASSISTANT

## 2021-10-19 PROCEDURE — 90686 IIV4 VACC NO PRSV 0.5 ML IM: CPT | Performed by: PHYSICIAN ASSISTANT

## 2021-10-30 DIAGNOSIS — F31.30 BIPOLAR I DISORDER, MOST RECENT EPISODE DEPRESSED (HCC): ICD-10-CM

## 2021-11-01 RX ORDER — DESVENLAFAXINE SUCCINATE 50 MG/1
TABLET, EXTENDED RELEASE ORAL
Qty: 90 TABLET | Refills: 0 | Status: SHIPPED | OUTPATIENT
Start: 2021-11-01 | End: 2021-11-03 | Stop reason: SDUPTHER

## 2021-11-01 RX ORDER — QUETIAPINE FUMARATE 25 MG/1
TABLET, FILM COATED ORAL
Qty: 90 TABLET | Refills: 0 | Status: SHIPPED | OUTPATIENT
Start: 2021-11-01 | End: 2021-11-03 | Stop reason: SDUPTHER

## 2021-11-03 ENCOUNTER — TELEMEDICINE (OUTPATIENT)
Dept: PSYCHIATRY | Facility: CLINIC | Age: 26
End: 2021-11-03

## 2021-11-03 DIAGNOSIS — F99 INSOMNIA DUE TO OTHER MENTAL DISORDER: ICD-10-CM

## 2021-11-03 DIAGNOSIS — F43.10 POST TRAUMATIC STRESS DISORDER (PTSD): ICD-10-CM

## 2021-11-03 DIAGNOSIS — F41.1 GENERALIZED ANXIETY DISORDER: Primary | ICD-10-CM

## 2021-11-03 DIAGNOSIS — F51.05 INSOMNIA DUE TO OTHER MENTAL DISORDER: ICD-10-CM

## 2021-11-03 DIAGNOSIS — F31.30 BIPOLAR I DISORDER, MOST RECENT EPISODE DEPRESSED (HCC): ICD-10-CM

## 2021-11-03 PROCEDURE — 99214 OFFICE O/P EST MOD 30 MIN: CPT | Performed by: NURSE PRACTITIONER

## 2021-11-03 RX ORDER — DESVENLAFAXINE SUCCINATE 50 MG/1
50 TABLET, EXTENDED RELEASE ORAL DAILY
Qty: 90 TABLET | Refills: 0 | Status: SHIPPED | OUTPATIENT
Start: 2021-11-03 | End: 2022-01-05 | Stop reason: SDUPTHER

## 2021-11-03 RX ORDER — QUETIAPINE FUMARATE 25 MG/1
25 TABLET, FILM COATED ORAL NIGHTLY
Qty: 90 TABLET | Refills: 0 | Status: SHIPPED | OUTPATIENT
Start: 2021-11-03 | End: 2022-01-05 | Stop reason: SDUPTHER
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
## 2021-11-07 DIAGNOSIS — E55.9 VITAMIN D DEFICIENCY: ICD-10-CM

## 2021-11-09 RX ORDER — METHYLPREDNISOLONE 4 MG/1
TABLET ORAL
Qty: 21 TABLET | Refills: 0 | Status: SHIPPED | OUTPATIENT
Start: 2021-11-09 | End: 2021-12-16

## 2021-12-16 DIAGNOSIS — Z30.9 ENCOUNTER FOR CONTRACEPTIVE MANAGEMENT, UNSPECIFIED TYPE: ICD-10-CM

## 2021-12-16 RX ORDER — MEDROXYPROGESTERONE ACETATE 150 MG/ML
150 INJECTION, SUSPENSION INTRAMUSCULAR
Qty: 1 ML | Refills: 3 | Status: SHIPPED | OUTPATIENT
Start: 2021-12-16 | End: 2022-11-22 | Stop reason: SDUPTHER

## 2021-12-29 ENCOUNTER — OFFICE VISIT (OUTPATIENT)
Dept: OBSTETRICS AND GYNECOLOGY | Facility: CLINIC | Age: 26
End: 2021-12-29

## 2021-12-29 VITALS
DIASTOLIC BLOOD PRESSURE: 83 MMHG | HEART RATE: 83 BPM | SYSTOLIC BLOOD PRESSURE: 121 MMHG | HEIGHT: 70 IN | BODY MASS INDEX: 20.9 KG/M2 | WEIGHT: 146 LBS

## 2021-12-29 DIAGNOSIS — Z01.419 WELL WOMAN EXAM WITH ROUTINE GYNECOLOGICAL EXAM: Primary | ICD-10-CM

## 2021-12-29 LAB
HBV SURFACE AG SERPL QL IA: NORMAL
HCV AB SER DONR QL: NORMAL
HIV1+2 AB SER QL: NORMAL
T PALLIDUM IGG SER QL: NORMAL

## 2021-12-29 PROCEDURE — 86803 HEPATITIS C AB TEST: CPT | Performed by: OBSTETRICS & GYNECOLOGY

## 2021-12-29 PROCEDURE — 86695 HERPES SIMPLEX TYPE 1 TEST: CPT | Performed by: OBSTETRICS & GYNECOLOGY

## 2021-12-29 PROCEDURE — 87661 TRICHOMONAS VAGINALIS AMPLIF: CPT | Performed by: OBSTETRICS & GYNECOLOGY

## 2021-12-29 PROCEDURE — 99395 PREV VISIT EST AGE 18-39: CPT | Performed by: OBSTETRICS & GYNECOLOGY

## 2021-12-29 PROCEDURE — G0432 EIA HIV-1/HIV-2 SCREEN: HCPCS | Performed by: OBSTETRICS & GYNECOLOGY

## 2021-12-29 PROCEDURE — 87591 N.GONORRHOEAE DNA AMP PROB: CPT | Performed by: OBSTETRICS & GYNECOLOGY

## 2021-12-29 PROCEDURE — 87340 HEPATITIS B SURFACE AG IA: CPT | Performed by: OBSTETRICS & GYNECOLOGY

## 2021-12-29 PROCEDURE — 87491 CHLMYD TRACH DNA AMP PROBE: CPT | Performed by: OBSTETRICS & GYNECOLOGY

## 2021-12-29 PROCEDURE — G0123 SCREEN CERV/VAG THIN LAYER: HCPCS | Performed by: OBSTETRICS & GYNECOLOGY

## 2021-12-29 PROCEDURE — 86780 TREPONEMA PALLIDUM: CPT | Performed by: OBSTETRICS & GYNECOLOGY

## 2021-12-29 PROCEDURE — 86696 HERPES SIMPLEX TYPE 2 TEST: CPT | Performed by: OBSTETRICS & GYNECOLOGY

## 2021-12-29 NOTE — PROGRESS NOTES
"Well Woman Visit    CC: WWE     [facility] intake: Yes  DID NOT QUALIFY TODAY  Tobacco/Nicotine use:  Former    HPI:   26 y.o. Contraception or HRT: Depo-Provera  Depo-Provera by PCP  Patient requests STD screening today    History: PMHx, Meds, Allergies, PSHx, Social Hx, and POBHx all reviewed and updated.  PCP: does manage PMHx and preventative labs      /83   Pulse 83   Ht 177.8 cm (70\")   Wt 66.2 kg (146 lb)   LMP  (LMP Unknown)   BMI 20.95 kg/m²     Physical Exam Physical Exam  Vitals and nursing note reviewed. Exam conducted with a chaperone present.   Constitutional:       Appearance: Normal appearance.   Neck:      Thyroid: No thyroid mass or thyromegaly.   Cardiovascular:      Rate and Rhythm: Regular rhythm.   Pulmonary:      Effort: Pulmonary effort is normal.      Unlabored  Chest:      Breasts: Soft        Right: No mass, nipple discharge or tenderness.         Left: No mass, nipple discharge or tenderness.   Abdominal:      General: There is no distension.      Palpations: Abdomen is soft.      Tenderness: There is no guarding or rebound.   Genitourinary:     General: Normal vulva.      Labia:   No lesions     Urethra: No urethral pain or urethral lesion.      Vagina: Normal. No vaginal discharge, tenderness or prolapsed vaginal walls.      Cervix: Normal.      Uterus: Normal.  Anteverted, nontender     Adnexa: Right adnexa normal and left adnexa normal.   Musculoskeletal:      Cervical back: Neck supple. No tenderness.   Skin:     General: Skin is warm and dry.   Neurological:      Mental Status: She is alert and oriented to person, place, and time.   Psychiatric:         Mood and Affect: Mood normal.         Behavior: Behavior normal.         Thought Content: Thought content normal.       ASSESSMENT AND PLAN:  WWE    Diagnoses and all orders for this visit:    1. Well woman exam with routine gynecological exam (Primary)  -     Hepatitis B Surface Antigen  -     Hepatitis C Antibody  -   "   HIV-1 & HIV-2 Antibodies  -     HSV 1 & 2 - Specific Antibody, IgG  -     T Pallidum Antibody (FTA-Ab)  -     IGP,CtNgTv,rfx Aptima HPV ASCU        Counseling:     Recommend calcium supplementation due to Depo-Provera use    Preventative:   MMG mammogram at age 40  Recommend FLU vaccine this season, R/B discussed  Recommend COVID vaccine, R/B discussed      Follow Up:  No follow-ups on file.        Fadi Herrera Sr., MD  12/29/2021

## 2021-12-31 LAB
HSV1 IGG SER IA-ACNC: 32.8 INDEX (ref 0–0.9)
HSV2 IGG SER IA-ACNC: <0.91 INDEX (ref 0–0.9)

## 2022-01-03 LAB
C TRACH RRNA CVX QL NAA+PROBE: NEGATIVE
CONV .: NORMAL
CYTOLOGIST CVX/VAG CYTO: NORMAL
CYTOLOGY CVX/VAG DOC CYTO: NORMAL
CYTOLOGY CVX/VAG DOC THIN PREP: NORMAL
DX ICD CODE: NORMAL
HIV 1 & 2 AB SER-IMP: NORMAL
N GONORRHOEA RRNA CVX QL NAA+PROBE: NEGATIVE
OTHER STN SPEC: NORMAL
STAT OF ADQ CVX/VAG CYTO-IMP: NORMAL
T VAGINALIS RRNA SPEC QL NAA+PROBE: NEGATIVE

## 2022-01-05 ENCOUNTER — TELEMEDICINE (OUTPATIENT)
Dept: PSYCHIATRY | Facility: CLINIC | Age: 27
End: 2022-01-05

## 2022-01-05 DIAGNOSIS — F99 INSOMNIA DUE TO OTHER MENTAL DISORDER: ICD-10-CM

## 2022-01-05 DIAGNOSIS — F43.10 POST TRAUMATIC STRESS DISORDER (PTSD): ICD-10-CM

## 2022-01-05 DIAGNOSIS — F31.30 BIPOLAR I DISORDER, MOST RECENT EPISODE DEPRESSED: ICD-10-CM

## 2022-01-05 DIAGNOSIS — F51.05 INSOMNIA DUE TO OTHER MENTAL DISORDER: ICD-10-CM

## 2022-01-05 DIAGNOSIS — F41.1 GENERALIZED ANXIETY DISORDER: Primary | ICD-10-CM

## 2022-01-05 PROCEDURE — 90833 PSYTX W PT W E/M 30 MIN: CPT | Performed by: NURSE PRACTITIONER

## 2022-01-05 PROCEDURE — 99214 OFFICE O/P EST MOD 30 MIN: CPT | Performed by: NURSE PRACTITIONER

## 2022-01-05 RX ORDER — QUETIAPINE FUMARATE 25 MG/1
25 TABLET, FILM COATED ORAL NIGHTLY
Qty: 30 TABLET | Refills: 2 | Status: SHIPPED | OUTPATIENT
Start: 2022-01-05 | End: 2022-03-30 | Stop reason: SDUPTHER

## 2022-01-05 RX ORDER — DESVENLAFAXINE SUCCINATE 50 MG/1
50 TABLET, EXTENDED RELEASE ORAL DAILY
Qty: 30 TABLET | Refills: 2 | Status: SHIPPED | OUTPATIENT
Start: 2022-01-05 | End: 2022-03-30 | Stop reason: SDUPTHER

## 2022-01-05 NOTE — PROGRESS NOTES
Subjective   Maurice Lazo is a 26 y.o. female who presents today for follow up. Mode of visit: Video  Location of provider: [address], [place] [address].  Location of patient: Home  Does the patient consent to use a video/audio connection for your medical care today? Yes  The visit included audio and video interaction. No technical issues occurred during this visit.    Chief Complaint:  Bipolar Disorder, anxiety, PTSD    History of Present Illness:   Depression over the past month- has been okay. Has had some increase in stress due to holidays.   Sleep- good  Interest- Denies anhedonia  Guilt- Denies  Energy- good  Concentration- denies  Appetite- good  Psychomotor retardation/agitation- Denies  Suicidal thoughts or hopelessness- denies hopelessness, si or hi.     Anxiety over the past month- has been high   Anxious, nervous or worried on most days about a number of events or activities- excessive worries  No control over worries- difficult to manage at times- uses grounding techniques  Irritability- denies  Concentration- see above  Sleep- see above  Restlessness- denies  Tension in muscles- endorses    Denies symptoms of kiersten or hypomania.     Access to Firearms: Denies    Past Surgical History:  Past Surgical History:   Procedure Laterality Date   • DILATATION AND CURETTAGE     • OTHER SURGICAL HISTORY      Surgical Clips   • ROTATOR CUFF REPAIR     • SHOULDER SURGERY     • WISDOM TOOTH EXTRACTION         Problem List:  Patient Active Problem List   Diagnosis   • Facial aging   • Anxiety   • Asthma   • Bipolar 1 disorder (HCC)   • Depression   • Hashimoto's thyroiditis   • Migraine headache   • Nicotine dependence   • Predisposition to allergic reaction   • Hypothyroidism   • Thyroid disorder   • Facial aging   • Well woman exam with routine gynecological exam       Allergy:   Allergies   Allergen Reactions   • Meperidine Hives and Nausea And Vomiting   • Lamictal [Lamotrigine]  Rash   • Tegaderm Ag Mesh [Silver] Rash        Discontinued Medications:  Medications Discontinued During This Encounter   Medication Reason   • QUEtiapine (SEROquel) 25 MG tablet Reorder   • desvenlafaxine (PRISTIQ) 50 MG 24 hr tablet Reorder       Current Medications:   Current Outpatient Medications   Medication Sig Dispense Refill   • albuterol sulfate  (90 Base) MCG/ACT inhaler Inhale 2 puffs Every 6 (Six) Hours As Needed for Wheezing. 18 g 6   • cetirizine (zyrTEC) 5 MG tablet Take 10 mg by mouth Daily.     • cholecalciferol (VITAMIN D3) 1.25 MG (07285 UT) capsule Take 1 capsule by mouth 1 (One) Time Per Week. 13 capsule 3   • cyclobenzaprine (FLEXERIL) 5 MG tablet cyclobenzaprine 5 mg oral tablet take 1 tablet (5 mg) by oral route BID prn 4/13/2021  Active     • desvenlafaxine (PRISTIQ) 50 MG 24 hr tablet Take 1 tablet by mouth Daily for 90 days. 30 tablet 2   • fexofenadine (Allegra Allergy) 180 MG tablet Allegra Allergy 180 mg oral tablet take 1 tablet (180 mg) by oral route once daily   Active     • levothyroxine (SYNTHROID, LEVOTHROID) 50 MCG tablet Take 1 tablet by mouth Daily. 90 tablet 1   • medroxyPROGESTERone (Depo-Provera) 150 MG/ML injection Inject 1 mL into the appropriate muscle as directed by prescriber Every 3 (Three) Months. 1 mL 3   • montelukast (SINGULAIR) 10 MG tablet Take 1 tablet by mouth Every Evening. 90 tablet 3   • propranolol (INDERAL) 20 MG tablet Take 1 tablet by mouth 3 (Three) Times a Day. 60 tablet 5   • QUEtiapine (SEROquel) 25 MG tablet Take 1 tablet by mouth Every Night for 90 days. 30 tablet 2   • SUMAtriptan (Imitrex) 100 MG tablet Take one tablet at onset of headache. May repeat dose one time in 2 hours if headache not relieved. 9 tablet 5     No current facility-administered medications for this visit.       Past Medical History:  Past Medical History:   Diagnosis Date   • Allergy    • Anxiety    • Asthma    • Bipolar 1 disorder (HCC)    • Depression    • Facial  "aging    • Gastroesophageal reflux disease    • Migraine headache    • Nicotine dependence 04/08/2021   • Thyroid disorder        Past Psychiatric History:  Medication Trials: Abilify, Rexulti and Vraylar for mood, with no benefits.  Prozac, Zoloft and Trintellix for depression and anxiety with little benefit.    Substance Abuse History:   Types: Denies  Withdrawal Symptoms: Denies  Longest Period Sober: Denies  AA: Denies      Social History     Socioeconomic History   • Marital status:    Tobacco Use   • Smoking status: Former Smoker     Packs/day: 0.50     Years: 2.00     Pack years: 1.00   • Smokeless tobacco: Never Used   • Tobacco comment: former, 1 packs per day, smoked for 6-10 years   Vaping Use   • Vaping Use: Never used   Substance and Sexual Activity   • Alcohol use: Yes     Comment: Current some day  rarely drinks   • Drug use: Never   • Sexual activity: Yes     Birth control/protection: Injection         Family History   Problem Relation Age of Onset   • Stroke Mother    • Lung cancer Maternal Grandmother    • Heart disease Other    • Anxiety disorder Father    • Bipolar disorder Paternal Aunt    • Schizophrenia Paternal Aunt        Mental Status Exam:     Appearance: good eye contact, normal street clothes, groomed, sitting in chair   Behavior: pleasant and cooperative  Motor: no abnormal  Speech: normal rhythm, rate, volume, tone, not hyperverbal, not pressured, normal prosidy  Mood: \"Okay\"  Affect: euthymic  Thought Content: negative suicidal ideations, negative homicidal ideations, negative obsessions  Perceptions: negative auditory hallucinations, negative visual hallucinations, negative delusions, negative paranoia  Thought Process: goal directed, linear  Insight/Judgement: fair/fair  Cognition: grossly intact  Attention: intact  Orientation: person, place, time and situation  Memory: intact    Review of Systems:     Constitutional: Denies fatigue, night sweats  Eyes: Denies double " vision, blurred vision  HENT: Denies vertigo, recent head injury  Cardiovascular: Denies chest pain, irregular heartbeats  Respiratory: Denies productive cough, shortness of breath  Gastrointestinal: Denies nausea, vomiting  Genitourinary: Denies dysuria, urinary retention  Integument: Denies hair growth change, new skin lesions  Neurologic: Denies altered mental status, seizures  Musculoskeletal: Denies joint swelling, limitation of motion  Endocrine: Denies cold intolerance, heat intolerance  Psychiatric: Admits anxiety, depression, PTSD. Denies kiersten, obsessive compulsive disorder, psychosis.   Allergic-immunologic: Denies frequent illnesses      Vital Signs:   There were no vitals taken for this visit.     Lab Results:   Office Visit on 12/29/2021   Component Date Value Ref Range Status   • Hepatitis B Surface Ag 12/29/2021 Non-Reactive  Non-Reactive Final   • Hepatitis C Ab 12/29/2021 Non-Reactive  Non-Reactive Final   • HIV-1/ HIV-2 12/29/2021 Non-Reactive  Non-Reactive Final   • HSV 1 IgG, Type Specific 12/29/2021 32.80* 0.00 - 0.90 index Final                                     Negative        <0.91                                   Equivocal 0.91 - 1.09                                   Positive        >1.09   Note: Negative indicates no antibodies detected to   HSV-1. Equivocal may suggest early infection.  If   clinically appropriate, retest at later date. Positive   indicates antibodies detected to HSV-1.   • HSV 2 IgG 12/29/2021 <0.91  0.00 - 0.90 index Final                                     Negative        <0.91                                   Equivocal 0.91 - 1.09                                   Positive        >1.09   Note: Negative indicates no antibodies detected to   HSV-2. Equivocal may suggest early infection.  If   clinically appropriate, retest at later date. Positive   indicates antibodies detected to HSV-2.   • Treponemal AB IgG 12/29/2021 Non-Reactive  Non-Reactive Final   •  Diagnosis 12/29/2021 Comment   Final    NEGATIVE FOR INTRAEPITHELIAL LESION OR MALIGNANCY.   • Specimen adequacy: 12/29/2021 Comment   Final    Satisfactory for evaluation.  Endocervical and/or squamous metaplastic  cells (endocervical component) are present.   • Clinician Provided ICD-10: 12/29/2021 Comment   Final    Z01.419   • Performed by: 12/29/2021 Comment   Final    Raji Webb, Cytotechnologist (ASCP)   • . 12/29/2021 .   Final   • Note: 12/29/2021 Comment   Final    The Pap smear is a screening test designed to aid in the detection of  premalignant and malignant conditions of the uterine cervix.  It is not a  diagnostic procedure and should not be used as the sole means of detecting  cervical cancer.  Both false-positive and false-negative reports do occur.   • Method: 12/29/2021 Comment   Final    This liquid based ThinPrep(R) pap test was screened with the  use of an image guided system.   • Conv .conv 12/29/2021 Comment   Final    The HPV DNA reflex criteria were not met with this specimen result  therefore, no HPV testing was performed.   • Chlamydia, Nuc. Acid Amp 12/29/2021 Negative  Negative Final   • Gonococcus by Nucleic Acid Amp 12/29/2021 Negative  Negative Final   • Trichomonas vaginosis 12/29/2021 Negative  Negative Final   Lab on 09/09/2021   Component Date Value Ref Range Status   • Glucose 09/09/2021 85  65 - 99 mg/dL Final   • BUN 09/09/2021 9  6 - 20 mg/dL Final   • Creatinine 09/09/2021 0.72  0.57 - 1.00 mg/dL Final   • Sodium 09/09/2021 139  136 - 145 mmol/L Final   • Potassium 09/09/2021 4.1  3.5 - 5.2 mmol/L Final   • Chloride 09/09/2021 107  98 - 107 mmol/L Final   • CO2 09/09/2021 21.0* 22.0 - 29.0 mmol/L Final   • Calcium 09/09/2021 9.6  8.6 - 10.5 mg/dL Final   • Total Protein 09/09/2021 6.8  6.0 - 8.5 g/dL Final   • Albumin 09/09/2021 4.50  3.50 - 5.20 g/dL Final   • ALT (SGPT) 09/09/2021 8  1 - 33 U/L Final   • AST (SGOT) 09/09/2021 11  1 - 32 U/L Final   • Alkaline  Phosphatase 09/09/2021 47  39 - 117 U/L Final   • Total Bilirubin 09/09/2021 0.3  0.0 - 1.2 mg/dL Final   • eGFR Non  Amer 09/09/2021 98  >60 mL/min/1.73 Final   • Globulin 09/09/2021 2.3  gm/dL Final   • A/G Ratio 09/09/2021 2.0  g/dL Final   • BUN/Creatinine Ratio 09/09/2021 12.5  7.0 - 25.0 Final   • Anion Gap 09/09/2021 11.0  5.0 - 15.0 mmol/L Final   • Total Cholesterol 09/09/2021 143  0 - 200 mg/dL Final   • Triglycerides 09/09/2021 89  0 - 150 mg/dL Final   • HDL Cholesterol 09/09/2021 45  40 - 60 mg/dL Final   • LDL Cholesterol  09/09/2021 81  0 - 100 mg/dL Final   • VLDL Cholesterol 09/09/2021 17  5 - 40 mg/dL Final   • LDL/HDL Ratio 09/09/2021 1.78   Final   • TSH 09/09/2021 2.850  0.270 - 4.200 uIU/mL Final   • 25 Hydroxy, Vitamin D 09/09/2021 47.4  30.0 - 100.0 ng/ml Final   • Hepatitis C Ab 09/09/2021 Non-Reactive  Non-Reactive Final   • WBC 09/09/2021 7.21  3.40 - 10.80 10*3/mm3 Final   • RBC 09/09/2021 4.35  3.77 - 5.28 10*6/mm3 Final   • Hemoglobin 09/09/2021 13.5  12.0 - 15.9 g/dL Final   • Hematocrit 09/09/2021 40.1  34.0 - 46.6 % Final   • MCV 09/09/2021 92.2  79.0 - 97.0 fL Final   • MCH 09/09/2021 31.0  26.6 - 33.0 pg Final   • MCHC 09/09/2021 33.7  31.5 - 35.7 g/dL Final   • RDW 09/09/2021 12.1* 12.3 - 15.4 % Final   • RDW-SD 09/09/2021 40.8  37.0 - 54.0 fl Final   • MPV 09/09/2021 10.9  6.0 - 12.0 fL Final   • Platelets 09/09/2021 275  140 - 450 10*3/mm3 Final   • Neutrophil % 09/09/2021 53.9  42.7 - 76.0 % Final   • Lymphocyte % 09/09/2021 35.5  19.6 - 45.3 % Final   • Monocyte % 09/09/2021 6.1  5.0 - 12.0 % Final   • Eosinophil % 09/09/2021 3.5  0.3 - 6.2 % Final   • Basophil % 09/09/2021 0.7  0.0 - 1.5 % Final   • Immature Grans % 09/09/2021 0.3  0.0 - 0.5 % Final   • Neutrophils, Absolute 09/09/2021 3.89  1.70 - 7.00 10*3/mm3 Final   • Lymphocytes, Absolute 09/09/2021 2.56  0.70 - 3.10 10*3/mm3 Final   • Monocytes, Absolute 09/09/2021 0.44  0.10 - 0.90 10*3/mm3 Final   •  Eosinophils, Absolute 09/09/2021 0.25  0.00 - 0.40 10*3/mm3 Final   • Basophils, Absolute 09/09/2021 0.05  0.00 - 0.20 10*3/mm3 Final   • Immature Grans, Absolute 09/09/2021 0.02  0.00 - 0.05 10*3/mm3 Final   • nRBC 09/09/2021 0.0  0.0 - 0.2 /100 WBC Final   Abstract on 05/27/2021   Component Date Value Ref Range Status   • HM EYE EXAM 03/23/2021 SEE REPORT   Final   • HM EYE EXAM 03/23/2021 SEE REPORT   Final    SEE REPORT   Conversion Encounter on 05/26/2021   Component Date Value Ref Range Status   • Leukocytes, UA 05/26/2021 Trace   Final   • Nitrite, UA 05/26/2021 Negative   Final   • Urobilinogen, UA 05/26/2021 0.2 E.U./dL   Final   • Protein, UA 05/26/2021 Trace   Final   • pH, UA 05/26/2021 5.5   Final   • Blood, UA 05/26/2021 Large   Final   • Specific Gravity, UA 05/26/2021 1.030   Final   • Ketones, UA 05/26/2021 Negative   Final   • Bilirubin, UA 05/26/2021 Negative   Final   • Glucose, UA 05/26/2021 Negative   Final   • Appearance 05/26/2021 Clear   Final   • Color, UA 05/26/2021 Yellow   Final       EKG Results:  No orders to display       Imaging Results:  No Images in the past 120 days found..      Assessment/Plan   Diagnoses and all orders for this visit:    1. Generalized anxiety disorder (Primary)    2. Bipolar I disorder, most recent episode depressed (HCC)  -     desvenlafaxine (PRISTIQ) 50 MG 24 hr tablet; Take 1 tablet by mouth Daily for 90 days.  Dispense: 30 tablet; Refill: 2  -     QUEtiapine (SEROquel) 25 MG tablet; Take 1 tablet by mouth Every Night for 90 days.  Dispense: 30 tablet; Refill: 2    3. Insomnia due to other mental disorder    4. Post traumatic stress disorder (PTSD)       Continue desvenlafaxine to target depression and anxiety. Continue quetiapine to target mood. 16 minutes of supportive psychotherapy with goal to strengthen defenses, promote problems solving, restore adaptive functioning and provide symptom relief. The therapeutic alliance was strengthened to encourage the  patient to express their thoughts and feelings. Esteem building was enhanced through praise, reassurance, normalizing and encouragement. Coping skills were enhanced to build distress tolerance skills and emotional regulation. Patient given education on medication side effects, diagnosis/illness and relapse symptoms. Plan to continue supportive psychotherapy in next appointment to provide symptom relief. 2 month follow up    Visit Diagnoses:    ICD-10-CM ICD-9-CM   1. Generalized anxiety disorder  F41.1 300.02   2. Bipolar I disorder, most recent episode depressed (HCC)  F31.30 296.50   3. Insomnia due to other mental disorder  F51.05 300.9    F99 327.02   4. Post traumatic stress disorder (PTSD)  F43.10 309.81       PLAN:  1. Safety: No acute safety concerns.   2. Therapy: Starting with [facility] at [facility]  3. Risk Assessment: Risk of self-harm acutely is moderate.  Risk factors include anxiety disorder, mood disorder, history of self harm and recent psychosocial stressors (pandemic). Protective factors include no family history, denies access to guns/weapons, no present SI, no history of suicide attempts, minimal AODA, healthcare seeking, future orientation, willingness to engage in care.  Risk of self-harm chronically is also moderate, but could be further elevated in the event of treatment noncompliance and/or AODA.  4. Medications: Continue desvenlafaxine 50 mg p.o. daily to target depression and anxiety.  Discussed all risks, benefits, alternatives, and side effects of Pristiq including but not limited to GI symptoms (N/V/D, constipation), sexual dysfunction, dizziness, insomnia, hyperhidrosis, anxiety, bleeding risk, seizure risk, CNS depression, dyslipidemia, activation of kiersten or hypomania, increased fragility fracture risk, hyponatremia, ocular effects, HTN, withdrawal syndrome following abrupt discontinuation, serotonin syndrome, and activation of suicidal ideation and behavior.   Discussed the need for pt to immediately call the office for any new or worsening symptoms, such as worsening depression; feeling nervous or restless; suicidal thoughts or actions; or other changes changes in mood or behavior, and all other concerns. Pt educated on med compliance and the risks of suddenly stopping this medication or missing doses. Pt verbalized understanding and is agreeable to taking Pristiq. Addressed all questions and concerns. CONTINUE quetiapine 25 mg p.o. nightly to target mood and insomnia.  Risks, benefits, alternatives discussed with patient including nausea and vomiting, GI upset, sedation, akathisia, hypotension, increased appetite, lowering of seizure threshold, theoretical risk of tardive dyskinesia, extrapyramidal symptoms, restless legs syndrome. After discussion of these risks and benefits, the patient voiced understanding and agreed to proceed.    5. Labs/studies: No labs/studies ordered at this time  6. Follow-up: 2 months      TREATMENT PLAN/GOALS: Continue supportive psychotherapy efforts and medications as indicated. Treatment and medication options discussed during today's visit. Patient ackowledged and verbally consented to continue with current treatment plan and was educated on the importance of compliance with treatment and follow-up appointments.    MEDICATION ISSUES:  RODNEY reviewed as expected.  Discussed medication options and treatment plan of prescribed medication as well as the risks, benefits, and side effects including potential falls, possible impaired driving and metabolic adversities among others. Patient is agreeable to call the office with any worsening of symptoms or onset of side effects. Patient is agreeable to call 911 or go to the nearest ER should he/she begin having SI/HI. No medication side effects or related complaints today.     MEDS ORDERED DURING VISIT:  New Medications Ordered This Visit   Medications   • desvenlafaxine (PRISTIQ) 50 MG 24 hr  tablet     Sig: Take 1 tablet by mouth Daily for 90 days.     Dispense:  30 tablet     Refill:  2   • QUEtiapine (SEROquel) 25 MG tablet     Sig: Take 1 tablet by mouth Every Night for 90 days.     Dispense:  30 tablet     Refill:  2       2 month follow up       This document has been electronically signed by LACHELLE Conde  January 5, 2022 08:17 EST      Part of this note may be an electronic transcription/translation of spoken language to printed text using the Dragon Dictation System.

## 2022-01-11 ENCOUNTER — LAB (OUTPATIENT)
Dept: FAMILY MEDICINE CLINIC | Facility: CLINIC | Age: 27
End: 2022-01-11

## 2022-01-11 DIAGNOSIS — R05.9 COUGH: Primary | ICD-10-CM

## 2022-01-11 DIAGNOSIS — R09.81 CONGESTED NOSE: ICD-10-CM

## 2022-01-11 LAB — SARS-COV-2 RNA PNL SPEC NAA+PROBE: NOT DETECTED

## 2022-01-11 PROCEDURE — U0004 COV-19 TEST NON-CDC HGH THRU: HCPCS | Performed by: PHYSICIAN ASSISTANT

## 2022-01-12 RX ORDER — AZITHROMYCIN 250 MG/1
TABLET, FILM COATED ORAL
Qty: 6 TABLET | Refills: 0 | Status: SHIPPED | OUTPATIENT
Start: 2022-01-12 | End: 2022-02-28

## 2022-01-12 RX ORDER — METHYLPREDNISOLONE 4 MG/1
TABLET ORAL
Qty: 21 EACH | Refills: 0 | Status: SHIPPED | OUTPATIENT
Start: 2022-01-12 | End: 2022-02-28

## 2022-02-01 ENCOUNTER — TELEPHONE (OUTPATIENT)
Dept: FAMILY MEDICINE CLINIC | Facility: CLINIC | Age: 27
End: 2022-02-01

## 2022-02-01 DIAGNOSIS — H71.91 CHOLESTEATOMA OF RIGHT EAR: Primary | ICD-10-CM

## 2022-02-02 ENCOUNTER — OFFICE VISIT (OUTPATIENT)
Dept: OTOLARYNGOLOGY | Facility: CLINIC | Age: 27
End: 2022-02-02

## 2022-02-02 VITALS — BODY MASS INDEX: 20.53 KG/M2 | TEMPERATURE: 97.3 F | HEIGHT: 70 IN | WEIGHT: 143.4 LBS

## 2022-02-02 DIAGNOSIS — H65.01 NON-RECURRENT ACUTE SEROUS OTITIS MEDIA OF RIGHT EAR: Primary | ICD-10-CM

## 2022-02-02 DIAGNOSIS — J30.9 ALLERGIC RHINITIS, UNSPECIFIED SEASONALITY, UNSPECIFIED TRIGGER: ICD-10-CM

## 2022-02-02 PROCEDURE — 99213 OFFICE O/P EST LOW 20 MIN: CPT | Performed by: NURSE PRACTITIONER

## 2022-02-02 RX ORDER — FLUTICASONE PROPIONATE 50 MCG
1 SPRAY, SUSPENSION (ML) NASAL 2 TIMES DAILY
Qty: 16 G | Refills: 0 | Status: SHIPPED | OUTPATIENT
Start: 2022-02-02 | End: 2023-01-03

## 2022-02-02 NOTE — PROGRESS NOTES
Patient Name: Maurice Lazo   Visit Date: 02/02/2022   Patient ID: [id number]  Provider: LACHELLE Casanova    Sex: female  Location: [facility]   YOB: 1995  Location Address: [address] [place] [address], [address],?[place]?[address]    Primary Care Provider Jassi De Leon PA  Location Phone: [phone]    Referring Provider: No ref. provider found        Chief Complaint  Cholesteatoma of right ear    Subjective   Maurice Lazo is a 26 y.o. female who presents to [facility] today as a consult from No ref. provider found for evaluation of her right ear.  She reports for the past 7 or 8 months she has felt as though there is fluid in her right ear.  She states that at times she feels like her hearing is muffled and will occasionally have a pain or pressure in her ear.  She states she frequently has allergic rhinitis symptoms, nasal congestion and rhinitis.  She states she occasionally uses Flonase nasal spray but is not currently on this.  No previous history of eustachian tube dysfunction, recurrent infections or ear surgeries.      Current Outpatient Medications on File Prior to Visit   Medication Sig   • albuterol sulfate  (90 Base) MCG/ACT inhaler Inhale 2 puffs Every 6 (Six) Hours As Needed for Wheezing.   • cetirizine (zyrTEC) 5 MG tablet Take 10 mg by mouth Daily.   • cholecalciferol (VITAMIN D3) 1.25 MG (02056 UT) capsule Take 1 capsule by mouth 1 (One) Time Per Week.   • cyclobenzaprine (FLEXERIL) 5 MG tablet cyclobenzaprine 5 mg oral tablet take 1 tablet (5 mg) by oral route BID prn 4/13/2021  Active   • desvenlafaxine (PRISTIQ) 50 MG 24 hr tablet Take 1 tablet by mouth Daily for 90 days.   • fexofenadine (Allegra Allergy) 180 MG tablet Allegra Allergy 180 mg oral tablet take 1 tablet (180 mg) by oral route once daily   Active   • levothyroxine (SYNTHROID, LEVOTHROID) 50 MCG tablet Take 1 tablet by mouth Daily.   • medroxyPROGESTERone  "(Depo-Provera) 150 MG/ML injection Inject 1 mL into the appropriate muscle as directed by prescriber Every 3 (Three) Months.   • montelukast (SINGULAIR) 10 MG tablet Take 1 tablet by mouth Every Evening.   • propranolol (INDERAL) 20 MG tablet Take 1 tablet by mouth 3 (Three) Times a Day.   • QUEtiapine (SEROquel) 25 MG tablet Take 1 tablet by mouth Every Night for 90 days.   • SUMAtriptan (Imitrex) 100 MG tablet Take one tablet at onset of headache. May repeat dose one time in 2 hours if headache not relieved.   • azithromycin (Zithromax Z-Ike) 250 MG tablet Take 2 tablets by mouth on day 1, then 1 tablet daily on days 2-5   • methylPREDNISolone (MEDROL) 4 MG dose pack Take as directed on package instructions.     No current facility-administered medications on file prior to visit.        Social History     Tobacco Use   • Smoking status: Former Smoker     Packs/day: 0.50     Years: 2.00     Pack years: 1.00     Types: Cigarettes     Quit date:      Years since quittin.0   • Smokeless tobacco: Never Used   • Tobacco comment: former, 1 packs per day, smoked for 6-10 years   Vaping Use   • Vaping Use: Never used   Substance Use Topics   • Alcohol use: Yes     Comment: Current some day  rarely drinks   • Drug use: Never       Objective     Vital Signs:   Temp 97.3 °F (36.3 °C) (Temporal)   Ht 177.8 cm (70\")   Wt 65 kg (143 lb 6.4 oz)   BMI 20.58 kg/m²       Physical Exam  Constitutional:       General: She is not in acute distress.     Appearance: Normal appearance. She is not ill-appearing.   HENT:      Head: Normocephalic and atraumatic.      Jaw: There is normal jaw occlusion. No tenderness or pain on movement.      Salivary Glands: Right salivary gland is not diffusely enlarged or tender. Left salivary gland is not diffusely enlarged or tender.      Right Ear: Ear canal and external ear normal. A middle ear effusion is present.      Left Ear: Tympanic membrane, ear canal and external ear normal.      " Ears:      Comments: Serous fluid present behind right tympanic membrane.  Politzerization technique revealed some movement of the TM and air bubbles behind the TM.     Nose: Nose normal. No septal deviation.      Right Turbinates: Enlarged and swollen.      Right Sinus: No maxillary sinus tenderness or frontal sinus tenderness.      Left Sinus: No maxillary sinus tenderness or frontal sinus tenderness.      Comments: Right inferior turbinate enlarged and swollen     Mouth/Throat:      Lips: Pink. No lesions.      Mouth: Mucous membranes are moist. No oral lesions.      Dentition: Normal dentition.      Tongue: No lesions.      Palate: No mass and lesions.      Pharynx: Oropharynx is clear. Uvula midline.      Tonsils: No tonsillar exudate.   Eyes:      Extraocular Movements: Extraocular movements intact.      Conjunctiva/sclera: Conjunctivae normal.      Pupils: Pupils are equal, round, and reactive to light.   Neck:      Thyroid: No thyroid mass, thyromegaly or thyroid tenderness.      Trachea: Trachea normal.   Pulmonary:      Effort: Pulmonary effort is normal. No respiratory distress.   Musculoskeletal:         General: Normal range of motion.      Cervical back: Normal range of motion and neck supple. No tenderness.   Lymphadenopathy:      Cervical: No cervical adenopathy.   Skin:     General: Skin is warm and dry.   Neurological:      General: No focal deficit present.      Mental Status: She is alert and oriented to person, place, and time.      Cranial Nerves: No cranial nerve deficit.      Motor: No weakness.      Gait: Gait normal.   Psychiatric:         Mood and Affect: Mood normal.         Behavior: Behavior normal.         Thought Content: Thought content normal.         Judgment: Judgment normal.               Result Review :              Assessment and Plan    Diagnoses and all orders for this visit:    1. Non-recurrent acute serous otitis media of right ear (Primary)  -     fluticasone (FLONASE) 50  MCG/ACT nasal spray; 1 spray into the nostril(s) as directed by provider 2 (Two) Times a Day for 30 days.  Dispense: 16 g; Refill: 0    2. Allergic rhinitis, unspecified seasonality, unspecified trigger    I am going to have her start doing Flonase nasal spray twice daily with politzerization techniques throughout the day to help open up her eustachian tube and hopefully clear the middle ear fluid.  I will plan to see her back in 2 weeks and if there is no improvement she continues to have persistent middle ear fluid will pursue nasal endoscopy and possible placement of right pressure equalization tube in clinic.       Follow Up   No follow-ups on file.  Patient was given instructions and counseling regarding her condition or for health maintenance advice. Please see specific information pulled into the AVS if appropriate.      LACHELLE Casanova

## 2022-02-24 ENCOUNTER — TELEPHONE (OUTPATIENT)
Dept: FAMILY MEDICINE CLINIC | Facility: CLINIC | Age: 27
End: 2022-02-24

## 2022-02-24 ENCOUNTER — HOSPITAL ENCOUNTER (OUTPATIENT)
Dept: GENERAL RADIOLOGY | Facility: HOSPITAL | Age: 27
Discharge: HOME | End: 2022-02-24
Admitting: PHYSICIAN ASSISTANT

## 2022-02-24 DIAGNOSIS — R05.9 COUGH: ICD-10-CM

## 2022-02-24 DIAGNOSIS — T50.A95A: ICD-10-CM

## 2022-02-24 DIAGNOSIS — G04.02: ICD-10-CM

## 2022-02-24 DIAGNOSIS — R05.9 COUGH: Primary | ICD-10-CM

## 2022-02-24 PROCEDURE — 71046 X-RAY EXAM CHEST 2 VIEWS: CPT

## 2022-02-28 ENCOUNTER — OFFICE VISIT (OUTPATIENT)
Dept: FAMILY MEDICINE CLINIC | Facility: CLINIC | Age: 27
End: 2022-02-28

## 2022-02-28 VITALS
SYSTOLIC BLOOD PRESSURE: 109 MMHG | HEART RATE: 88 BPM | BODY MASS INDEX: 20.01 KG/M2 | DIASTOLIC BLOOD PRESSURE: 78 MMHG | OXYGEN SATURATION: 96 % | HEIGHT: 70 IN | WEIGHT: 139.8 LBS

## 2022-02-28 DIAGNOSIS — O92.79 LACTATION SYMPTOM: Primary | ICD-10-CM

## 2022-02-28 DIAGNOSIS — G43.109 MIGRAINE WITH AURA AND WITHOUT STATUS MIGRAINOSUS, NOT INTRACTABLE: ICD-10-CM

## 2022-02-28 DIAGNOSIS — Z20.822 COUGH WITH EXPOSURE TO COVID-19 VIRUS: ICD-10-CM

## 2022-02-28 DIAGNOSIS — R05.8 COUGH WITH EXPOSURE TO COVID-19 VIRUS: ICD-10-CM

## 2022-02-28 PROCEDURE — 99213 OFFICE O/P EST LOW 20 MIN: CPT | Performed by: PHYSICIAN ASSISTANT

## 2022-02-28 RX ORDER — BUDESONIDE, GLYCOPYRROLATE, AND FORMOTEROL FUMARATE 160; 9; 4.8 UG/1; UG/1; UG/1
2 AEROSOL, METERED RESPIRATORY (INHALATION) 2 TIMES DAILY
Qty: 10.7 G | Refills: 0 | COMMUNITY
Start: 2022-02-28 | End: 2022-03-13 | Stop reason: SDUPTHER

## 2022-02-28 RX ORDER — RIMEGEPANT SULFATE 75 MG/75MG
75 TABLET, ORALLY DISINTEGRATING ORAL DAILY PRN
Qty: 8 TABLET | Refills: 5 | Status: SHIPPED | OUTPATIENT
Start: 2022-02-28 | End: 2022-08-29 | Stop reason: SDUPTHER

## 2022-02-28 NOTE — PROGRESS NOTES
Chief Complaint  Hypothyroidism (6 month follow up), Migraine, Asthma, Anxiety, and Depression    Subjective          Maurice Lazo presents to [facility]  History of Present Illness   Pt presents today for 6 month follow up.     Pt stated she believes she is still lactating. This has been an issue since pt had son in 2016- she  but never 'dried up'.     Pt notes she had COVID-19 approx 1 month ago. Pt has lingering cough- recent CXR negative.     Pt notes Imitrex worked for migraines but did cause nausea- she d/c because of this. Still having occasional migraines.      Labs-9/2021  Pap-12/2021    Past Medical History:   Diagnosis Date   • Allergy    • Anxiety    • Asthma    • Bipolar 1 disorder (HCC)    • Depression    • Facial aging    • Gastroesophageal reflux disease    • Migraine headache    • Nicotine dependence 04/08/2021   • Thyroid disorder       Family History   Problem Relation Age of Onset   • Stroke Mother    • Lung cancer Maternal Grandmother    • Heart disease Other    • Anxiety disorder Father    • Bipolar disorder Paternal Aunt    • Schizophrenia Paternal Aunt       Past Surgical History:   Procedure Laterality Date   • DILATATION AND CURETTAGE     • OTHER SURGICAL HISTORY      Surgical Clips   • ROTATOR CUFF REPAIR     • SHOULDER SURGERY     • WISDOM TOOTH EXTRACTION            Current Outpatient Medications:   •  albuterol sulfate  (90 Base) MCG/ACT inhaler, Inhale 2 puffs Every 6 (Six) Hours As Needed for Wheezing., Disp: 18 g, Rfl: 6  •  cholecalciferol (VITAMIN D3) 1.25 MG (94058 UT) capsule, Take 1 capsule by mouth 1 (One) Time Per Week., Disp: 13 capsule, Rfl: 3  •  cyclobenzaprine (FLEXERIL) 5 MG tablet, cyclobenzaprine 5 mg oral tablet take 1 tablet (5 mg) by oral route BID prn 4/13/2021  Active, Disp: , Rfl:   •  desvenlafaxine (PRISTIQ) 50 MG 24 hr tablet, Take 1 tablet by mouth Daily for 90 days., Disp: 30 tablet, Rfl: 2  •  fexofenadine  "(Allegra Allergy) 180 MG tablet, Allegra Allergy 180 mg oral tablet take 1 tablet (180 mg) by oral route once daily   Active, Disp: , Rfl:   •  fluticasone (FLONASE) 50 MCG/ACT nasal spray, 1 spray into the nostril(s) as directed by provider 2 (Two) Times a Day for 30 days., Disp: 16 g, Rfl: 0  •  levothyroxine (SYNTHROID, LEVOTHROID) 50 MCG tablet, Take 1 tablet by mouth Daily., Disp: 90 tablet, Rfl: 1  •  medroxyPROGESTERone (Depo-Provera) 150 MG/ML injection, Inject 1 mL into the appropriate muscle as directed by prescriber Every 3 (Three) Months., Disp: 1 mL, Rfl: 3  •  montelukast (SINGULAIR) 10 MG tablet, Take 1 tablet by mouth Every Evening., Disp: 90 tablet, Rfl: 3  •  propranolol (INDERAL) 20 MG tablet, Take 1 tablet by mouth 3 (Three) Times a Day., Disp: 60 tablet, Rfl: 5  •  QUEtiapine (SEROquel) 25 MG tablet, Take 1 tablet by mouth Every Night for 90 days., Disp: 30 tablet, Rfl: 2  •  Budeson-Glycopyrrol-Formoterol (Breztri Aerosphere) 160-9-4.8 MCG/ACT aerosol inhaler, Inhale 2 puffs 2 (Two) Times a Day., Disp: 10.7 g, Rfl: 0  •  Rimegepant Sulfate (Nurtec) 75 MG tablet dispersible tablet, Take 1 tablet by mouth Daily As Needed (Headache)., Disp: 8 tablet, Rfl: 5    Objective     Vital Signs:     /78 (BP Location: Left arm)   Pulse 88   Ht 176.5 cm (69.5\")   Wt 63.4 kg (139 lb 12.8 oz)   SpO2 96%   BMI 20.35 kg/m²    Estimated body mass index is 20.35 kg/m² as calculated from the following:    Height as of this encounter: 176.5 cm (69.5\").    Weight as of this encounter: 63.4 kg (139 lb 12.8 oz).     Wt Readings from Last 3 Encounters:   02/28/22 63.4 kg (139 lb 12.8 oz)   02/02/22 65 kg (143 lb 6.4 oz)   12/29/21 66.2 kg (146 lb)     BP Readings from Last 3 Encounters:   02/28/22 109/78   12/29/21 121/83   08/30/21 132/75     Physical Exam  Vitals and nursing note reviewed.   Constitutional:       Appearance: Normal appearance.   HENT:      Head: Normocephalic and atraumatic. "   Cardiovascular:      Rate and Rhythm: Normal rate and regular rhythm.      Heart sounds: Normal heart sounds.   Pulmonary:      Effort: Pulmonary effort is normal.      Breath sounds: Wheezing present.   Musculoskeletal:      Cervical back: Neck supple.   Neurological:      Mental Status: She is alert.   Psychiatric:         Mood and Affect: Mood normal.         Behavior: Behavior normal.        Result Review :                       Assessment and Plan      Diagnoses and all orders for this visit:    1. Lactation symptom (Primary)  -     Prolactin; Future  -     Follicle Stimulating Hormone; Future  -     Luteinizing Hormone; Future  -     T4, Free  -     US Breast Left Complete; Future  -     US Breast Right Complete; Future    2. Migraine with aura and without status migrainosus, not intractable  -     Rimegepant Sulfate (Nurtec) 75 MG tablet dispersible tablet; Take 1 tablet by mouth Daily As Needed (Headache).  Dispense: 8 tablet; Refill: 5    3. Cough with exposure to COVID-19 virus  -     Budeson-Glycopyrrol-Formoterol (Breztri Aerosphere) 160-9-4.8 MCG/ACT aerosol inhaler; Inhale 2 puffs 2 (Two) Times a Day.  Dispense: 10.7 g; Refill: 0       Follow Up     Return in about 3 months (around 5/28/2022), or if symptoms worsen or fail to improve.    Patient was given instructions and counseling regarding her condition or for health maintenance advice. Please see specific information pulled into the AVS if appropriate.     I have reviewed information obtained and documented by others and I have confirmed the accuracy of this documented note.    VALERIE Matthews

## 2022-03-02 ENCOUNTER — LAB (OUTPATIENT)
Dept: LAB | Facility: HOSPITAL | Age: 27
End: 2022-03-02

## 2022-03-02 ENCOUNTER — TELEPHONE (OUTPATIENT)
Dept: FAMILY MEDICINE CLINIC | Facility: CLINIC | Age: 27
End: 2022-03-02

## 2022-03-02 DIAGNOSIS — O92.79 LACTATION SYMPTOM: ICD-10-CM

## 2022-03-02 LAB
FSH SERPL-ACNC: 5.58 MIU/ML
LH SERPL-ACNC: 10.5 MIU/ML
PROLACTIN SERPL-MCNC: 18.8 NG/ML (ref 4.79–23.3)

## 2022-03-02 PROCEDURE — 83002 ASSAY OF GONADOTROPIN (LH): CPT

## 2022-03-02 PROCEDURE — 36415 COLL VENOUS BLD VENIPUNCTURE: CPT

## 2022-03-02 PROCEDURE — 84146 ASSAY OF PROLACTIN: CPT

## 2022-03-02 PROCEDURE — 83001 ASSAY OF GONADOTROPIN (FSH): CPT

## 2022-03-02 NOTE — TELEPHONE ENCOUNTER
Caller: TRIP    Relationship: Other    Best call back number: [phone]      What is the best time to reach you: ANYTIME    Who are you requesting to speak with (clinical staff, provider,  specific staff member): CLINICAL    Do you know the name of the person who called: [facility] [employer]     What was the call regarding:    TRIP STATE A PA IS NEEDED FOR NURTEC. SHE IS REQUESTING PCP TO PROVIDE A PA FOR THE COVERAGE. SHE SAID IT CAN BE FAXED AT THE NUMBER BELOW , CALLED OR GO THROUGH [employer].       [phone]        Do you require a callback:     YES

## 2022-03-11 ENCOUNTER — TELEPHONE (OUTPATIENT)
Dept: FAMILY MEDICINE CLINIC | Facility: CLINIC | Age: 27
End: 2022-03-11

## 2022-03-11 DIAGNOSIS — R00.2 PALPITATIONS: ICD-10-CM

## 2022-03-11 DIAGNOSIS — R00.2 PALPITATION: ICD-10-CM

## 2022-03-11 DIAGNOSIS — I47.1 SVT (SUPRAVENTRICULAR TACHYCARDIA): Primary | ICD-10-CM

## 2022-03-13 DIAGNOSIS — Z20.822 COUGH WITH EXPOSURE TO COVID-19 VIRUS: ICD-10-CM

## 2022-03-13 DIAGNOSIS — R05.8 COUGH WITH EXPOSURE TO COVID-19 VIRUS: ICD-10-CM

## 2022-03-14 ENCOUNTER — TELEPHONE (OUTPATIENT)
Dept: FAMILY MEDICINE CLINIC | Facility: CLINIC | Age: 27
End: 2022-03-14

## 2022-03-14 DIAGNOSIS — R05.8 COUGH WITH EXPOSURE TO COVID-19 VIRUS: ICD-10-CM

## 2022-03-14 DIAGNOSIS — Z20.822 COUGH WITH EXPOSURE TO COVID-19 VIRUS: ICD-10-CM

## 2022-03-14 RX ORDER — BUDESONIDE, GLYCOPYRROLATE, AND FORMOTEROL FUMARATE 160; 9; 4.8 UG/1; UG/1; UG/1
2 AEROSOL, METERED RESPIRATORY (INHALATION) 2 TIMES DAILY
Qty: 10.7 G | Refills: 3 | COMMUNITY
Start: 2022-03-14 | End: 2022-03-14 | Stop reason: SDUPTHER

## 2022-03-14 RX ORDER — BUDESONIDE, GLYCOPYRROLATE, AND FORMOTEROL FUMARATE 160; 9; 4.8 UG/1; UG/1; UG/1
2 AEROSOL, METERED RESPIRATORY (INHALATION) 2 TIMES DAILY
Qty: 10.7 G | Refills: 3 | Status: SHIPPED | OUTPATIENT
Start: 2022-03-14 | End: 2022-08-29 | Stop reason: SDUPTHER

## 2022-03-16 ENCOUNTER — OFFICE VISIT (OUTPATIENT)
Dept: CARDIOLOGY | Facility: CLINIC | Age: 27
End: 2022-03-16

## 2022-03-16 VITALS
SYSTOLIC BLOOD PRESSURE: 126 MMHG | HEART RATE: 86 BPM | HEIGHT: 70 IN | BODY MASS INDEX: 19.76 KG/M2 | DIASTOLIC BLOOD PRESSURE: 81 MMHG | WEIGHT: 138 LBS

## 2022-03-16 DIAGNOSIS — R00.2 PALPITATIONS: Primary | ICD-10-CM

## 2022-03-16 PROCEDURE — 93000 ELECTROCARDIOGRAM COMPLETE: CPT | Performed by: INTERNAL MEDICINE

## 2022-03-16 PROCEDURE — 99203 OFFICE O/P NEW LOW 30 MIN: CPT | Performed by: INTERNAL MEDICINE

## 2022-03-16 NOTE — PROGRESS NOTES
Chief Complaint  Rapid Heart Rate    Subjective            Maurice SHIRA Lazo presents to [facility]  History of Present Illness    26-year-old white female.  She has had intermittent tachycardia over the years.  Sometimes this gets rather severe, and is associated with some dizziness and blurry vision.  She has been on low-dose propranolol over time.  Last week she had a random episode of tachycardia, sudden onset, her smart watch indicated her heart rate was 238 and it lasted about 15 minutes and then quickly resolved.  The episodes are relatively infrequent and she is guessing they occur about every couple months.  No syncope.    PMH  Past Medical History:   Diagnosis Date   • Allergy    • Anxiety    • Asthma    • Bipolar 1 disorder (HCC)    • Depression    • Facial aging    • Gastroesophageal reflux disease    • Migraine headache    • Nicotine dependence 2021   • Thyroid disorder          SURGICALHX  Past Surgical History:   Procedure Laterality Date   • DILATATION AND CURETTAGE     • OTHER SURGICAL HISTORY      Surgical Clips   • ROTATOR CUFF REPAIR     • SHOULDER SURGERY     • WISDOM TOOTH EXTRACTION          SOC  Social History     Socioeconomic History   • Marital status:    Tobacco Use   • Smoking status: Former Smoker     Packs/day: 0.50     Years: 2.00     Pack years: 1.00     Types: Cigarettes     Quit date:      Years since quittin.2   • Smokeless tobacco: Never Used   • Tobacco comment: former, 1 packs per day, smoked for 6-10 years   Vaping Use   • Vaping Use: Never used   Substance and Sexual Activity   • Alcohol use: Yes     Comment: Current some day  rarely drinks   • Drug use: Never   • Sexual activity: Yes     Birth control/protection: Injection         FAMHX  Family History   Problem Relation Age of Onset   • Stroke Mother    • Lung cancer Maternal Grandmother    • Heart disease Other    • Anxiety disorder Father    • Bipolar disorder Paternal Aunt     • Schizophrenia Paternal Aunt           ALLERGY  Allergies   Allergen Reactions   • Meperidine Hives and Nausea And Vomiting   • Lamictal [Lamotrigine] Rash   • Tegaderm Ag Mesh [Silver] Rash        MEDSCURRENT    Current Outpatient Medications:   •  albuterol sulfate  (90 Base) MCG/ACT inhaler, Inhale 2 puffs Every 6 (Six) Hours As Needed for Wheezing., Disp: 18 g, Rfl: 6  •  Budeson-Glycopyrrol-Formoterol (Breztri Aerosphere) 160-9-4.8 MCG/ACT aerosol inhaler, Inhale 2 puffs 2 (Two) Times a Day., Disp: 10.7 g, Rfl: 3  •  cholecalciferol (VITAMIN D3) 1.25 MG (80210 UT) capsule, Take 1 capsule by mouth 1 (One) Time Per Week., Disp: 13 capsule, Rfl: 3  •  cyclobenzaprine (FLEXERIL) 5 MG tablet, cyclobenzaprine 5 mg oral tablet take 1 tablet (5 mg) by oral route BID prn 4/13/2021  Active, Disp: , Rfl:   •  desvenlafaxine (PRISTIQ) 50 MG 24 hr tablet, Take 1 tablet by mouth Daily for 90 days., Disp: 30 tablet, Rfl: 2  •  fexofenadine (ALLEGRA) 180 MG tablet, Allegra Allergy 180 mg oral tablet take 1 tablet (180 mg) by oral route once daily   Active, Disp: , Rfl:   •  levothyroxine (SYNTHROID, LEVOTHROID) 50 MCG tablet, Take 1 tablet by mouth Daily., Disp: 90 tablet, Rfl: 1  •  medroxyPROGESTERone (Depo-Provera) 150 MG/ML injection, Inject 1 mL into the appropriate muscle as directed by prescriber Every 3 (Three) Months., Disp: 1 mL, Rfl: 3  •  montelukast (SINGULAIR) 10 MG tablet, Take 1 tablet by mouth Every Evening., Disp: 90 tablet, Rfl: 3  •  propranolol (INDERAL) 20 MG tablet, Take 1 tablet by mouth 3 (Three) Times a Day., Disp: 60 tablet, Rfl: 5  •  QUEtiapine (SEROquel) 25 MG tablet, Take 1 tablet by mouth Every Night for 90 days., Disp: 30 tablet, Rfl: 2  •  Rimegepant Sulfate (Nurtec) 75 MG tablet dispersible tablet, Take 1 tablet by mouth Daily As Needed (Headache)., Disp: 8 tablet, Rfl: 5  •  fluticasone (FLONASE) 50 MCG/ACT nasal spray, 1 spray into the nostril(s) as directed by provider 2 (Two)  "Times a Day for 30 days., Disp: 16 g, Rfl: 0      Review of Systems   Constitutional: Negative.   HENT: Negative.    Eyes: Negative.    Cardiovascular: Positive for irregular heartbeat and palpitations. Negative for chest pain.   Respiratory: Negative.    Endocrine: Negative.    Hematologic/Lymphatic: Negative.    Skin: Negative.    Musculoskeletal: Negative.    Gastrointestinal: Negative.    Genitourinary: Negative.    Neurological: Negative.    Psychiatric/Behavioral: Negative.         Objective     /81   Pulse 86   Ht 177.8 cm (70\")   Wt 62.6 kg (138 lb)   BMI 19.80 kg/m²       General Appearance:   · well developed  · well nourished  HENT:   · oropharynx moist  · lips not cyanotic  Neck:  · thyroid not enlarged  · supple  Respiratory:  · no respiratory distress  · normal breath sounds  · no rales  Cardiovascular:  · no jugular venous distention  · regular rhythm  · apical impulse normal  · S1 normal, S2 normal  · no S3, no S4   · no murmur  · no rub, no thrill  · carotid pulses normal; no bruit  · pedal pulses normal  · lower extremity edema: none    Musculoskeletal:  · no clubbing of fingers.   · normocephalic, head atraumatic  Skin:   · warm, dry  Psychiatric:  · judgement and insight appropriate  · normal mood and affect      Result Review :     The following data was reviewed by: Raji Peguero MD on 03/16/2022:    CMP    CMP 9/9/21   Glucose 85   BUN 9   Creatinine 0.72   eGFR Non African Am 98   Sodium 139   Potassium 4.1   Chloride 107   Calcium 9.6   Albumin 4.50   Total Bilirubin 0.3   Alkaline Phosphatase 47   AST (SGOT) 11   ALT (SGPT) 8           CBC    CBC 9/9/21   WBC 7.21   RBC 4.35   Hemoglobin 13.5   Hematocrit 40.1   MCV 92.2   MCH 31.0   MCHC 33.7   RDW 12.1 (A)   Platelets 275   (A) Abnormal value            Lipid Panel    Lipid Panel 9/9/21   Total Cholesterol 143   Triglycerides 89   HDL Cholesterol 45   VLDL Cholesterol 17   LDL Cholesterol  81   LDL/HDL Ratio 1.78 "           TSH    TSH 9/9/21   TSH 2.850                    ECG 12 Lead    Date/Time: 3/16/2022 12:38 PM  Performed by: SHIRA Peguero MD  Authorized by: SHIRA Peguero MD   Previous ECG: no previous ECG available  Rhythm: sinus rhythm  Conduction: conduction normal  ST Segments: ST segments normal  T Waves: T waves normal  QRS axis: normal  Other: no other findings    Clinical impression: normal ECG                     Assessment and Plan        ASSESSMENT:  Encounter Diagnosis   Name Primary?   • Palpitations Yes         PLAN:    1.  The patient is having intermittent subjective tachycardia, likely episodes of SVT.  At this point they are relatively infrequent.  A 30-day event monitor will be scheduled.  She had an echocardiogram within the past year or so and she was told that was normal.  2.  We will review event monitor findings and schedule follow-up from there            Patient was given instructions and counseling regarding her condition or for health maintenance advice. Please see specific information pulled into the AVS if appropriate.             SHIRA Peguero MD  3/16/2022    12:35 EDT

## 2022-03-30 ENCOUNTER — TELEMEDICINE (OUTPATIENT)
Dept: PSYCHIATRY | Facility: CLINIC | Age: 27
End: 2022-03-30

## 2022-03-30 DIAGNOSIS — F43.10 POST TRAUMATIC STRESS DISORDER (PTSD): ICD-10-CM

## 2022-03-30 DIAGNOSIS — F31.30 BIPOLAR I DISORDER, MOST RECENT EPISODE DEPRESSED: Primary | ICD-10-CM

## 2022-03-30 DIAGNOSIS — F51.05 INSOMNIA DUE TO OTHER MENTAL DISORDER: ICD-10-CM

## 2022-03-30 DIAGNOSIS — F99 INSOMNIA DUE TO OTHER MENTAL DISORDER: ICD-10-CM

## 2022-03-30 DIAGNOSIS — F41.1 GENERALIZED ANXIETY DISORDER: ICD-10-CM

## 2022-03-30 PROCEDURE — 99214 OFFICE O/P EST MOD 30 MIN: CPT | Performed by: NURSE PRACTITIONER

## 2022-03-30 RX ORDER — QUETIAPINE FUMARATE 25 MG/1
25 TABLET, FILM COATED ORAL NIGHTLY
Qty: 30 TABLET | Refills: 2 | Status: SHIPPED | OUTPATIENT
Start: 2022-03-30 | End: 2022-05-24 | Stop reason: SDUPTHER

## 2022-03-30 RX ORDER — DESVENLAFAXINE SUCCINATE 50 MG/1
50 TABLET, EXTENDED RELEASE ORAL DAILY
Qty: 30 TABLET | Refills: 2 | Status: SHIPPED | OUTPATIENT
Start: 2022-03-30 | End: 2022-05-24 | Stop reason: SDUPTHER

## 2022-03-30 NOTE — PROGRESS NOTES
Subjective   Maurice Lazo is a 26 y.o. female who presents today for follow up. Mode of visit: Video  Location of provider: [address], [place] [address].  Location of patient: Home  Does the patient consent to use a video/audio connection for your medical care today? Yes  The visit included audio and video interaction. No technical issues occurred during this visit.    Chief Complaint:  Bipolar Disorder, anxiety, PTSD    History of Present Illness:   Depression over the past month- has been good  Sleep- good  Interest- Denies anhedonia  Guilt- Denies  Energy- good  Concentration- denies  Appetite- good  Psychomotor retardation/agitation- Denies  Suicidal thoughts or hopelessness- denies hopelessness, si or hi.     Anxiety over the past month- has been better  Anxious, nervous or worried on most days about a number of events or activities- less worries  No control over worries- able to manage better- working on mindfulness  Irritability- denies  Concentration- see above  Sleep- see above  Restlessness- denies  Tension in muscles- endorses    Denies symptoms of kiersten or hypomania.     Access to Firearms: Denies    Past Surgical History:  Past Surgical History:   Procedure Laterality Date   • DILATATION AND CURETTAGE     • OTHER SURGICAL HISTORY      Surgical Clips   • ROTATOR CUFF REPAIR     • SHOULDER SURGERY     • WISDOM TOOTH EXTRACTION         Problem List:  Patient Active Problem List   Diagnosis   • Facial aging   • Anxiety   • Asthma   • Bipolar 1 disorder (HCC)   • Depression   • Hashimoto's thyroiditis   • Migraine headache   • Nicotine dependence   • Predisposition to allergic reaction   • Hypothyroidism   • Thyroid disorder   • Facial aging   • Well woman exam with routine gynecological exam       Allergy:   Allergies   Allergen Reactions   • Meperidine Hives and Nausea And Vomiting   • Lamictal [Lamotrigine] Rash   • Tegaderm Ag Mesh [Silver] Rash        Discontinued  Medications:  Medications Discontinued During This Encounter   Medication Reason   • fexofenadine (ALLEGRA) 180 MG tablet *Therapy completed   • desvenlafaxine (PRISTIQ) 50 MG 24 hr tablet Reorder   • QUEtiapine (SEROquel) 25 MG tablet Reorder       Current Medications:   Current Outpatient Medications   Medication Sig Dispense Refill   • albuterol sulfate  (90 Base) MCG/ACT inhaler Inhale 2 puffs Every 6 (Six) Hours As Needed for Wheezing. 18 g 6   • Budeson-Glycopyrrol-Formoterol (Breztri Aerosphere) 160-9-4.8 MCG/ACT aerosol inhaler Inhale 2 puffs 2 (Two) Times a Day. 10.7 g 3   • cholecalciferol (VITAMIN D3) 1.25 MG (23176 UT) capsule Take 1 capsule by mouth 1 (One) Time Per Week. 13 capsule 3   • cyclobenzaprine (FLEXERIL) 5 MG tablet cyclobenzaprine 5 mg oral tablet take 1 tablet (5 mg) by oral route BID prn 4/13/2021  Active     • desvenlafaxine (PRISTIQ) 50 MG 24 hr tablet Take 1 tablet by mouth Daily for 90 days. 30 tablet 2   • fluticasone (FLONASE) 50 MCG/ACT nasal spray 1 spray into the nostril(s) as directed by provider 2 (Two) Times a Day for 30 days. 16 g 0   • levothyroxine (SYNTHROID, LEVOTHROID) 50 MCG tablet Take 1 tablet by mouth Daily. 90 tablet 1   • medroxyPROGESTERone (Depo-Provera) 150 MG/ML injection Inject 1 mL into the appropriate muscle as directed by prescriber Every 3 (Three) Months. 1 mL 3   • montelukast (SINGULAIR) 10 MG tablet Take 1 tablet by mouth Every Evening. 90 tablet 3   • propranolol (INDERAL) 20 MG tablet Take 1 tablet by mouth 3 (Three) Times a Day. 60 tablet 5   • QUEtiapine (SEROquel) 25 MG tablet Take 1 tablet by mouth Every Night for 90 days. 30 tablet 2   • Rimegepant Sulfate (Nurtec) 75 MG tablet dispersible tablet Take 1 tablet by mouth Daily As Needed (Headache). 8 tablet 5     No current facility-administered medications for this visit.       Past Medical History:  Past Medical History:   Diagnosis Date   • Allergy    • Anxiety    • Asthma    • Bipolar 1  "disorder (HCC)    • Depression    • Facial aging    • Gastroesophageal reflux disease    • Migraine headache    • Nicotine dependence 2021   • Thyroid disorder        Past Psychiatric History:  Medication Trials: Abilify, Rexulti and Vraylar for mood, with no benefits.  Prozac, Zoloft and Trintellix for depression and anxiety with little benefit.    Substance Abuse History:   Types: Denies  Withdrawal Symptoms: Denies  Longest Period Sober: Denies  AA: Denies      Social History     Socioeconomic History   • Marital status:    Tobacco Use   • Smoking status: Former Smoker     Packs/day: 0.50     Years: 2.00     Pack years: 1.00     Types: Cigarettes     Quit date:      Years since quittin.2   • Smokeless tobacco: Never Used   • Tobacco comment: former, 1 packs per day, smoked for 6-10 years   Vaping Use   • Vaping Use: Never used   Substance and Sexual Activity   • Alcohol use: Yes     Comment: Current some day  rarely drinks   • Drug use: Never   • Sexual activity: Yes     Birth control/protection: Injection         Family History   Problem Relation Age of Onset   • Stroke Mother    • Lung cancer Maternal Grandmother    • Heart disease Other    • Anxiety disorder Father    • Bipolar disorder Paternal Aunt    • Schizophrenia Paternal Aunt        Mental Status Exam:     Appearance: good eye contact, normal street clothes, groomed, sitting in chair   Behavior: pleasant and cooperative  Motor: no abnormal  Speech: normal rhythm, rate, volume, tone, not hyperverbal, not pressured, normal prosidy  Mood: \"Better\"  Affect: euthymic  Thought Content: negative suicidal ideations, negative homicidal ideations, negative obsessions  Perceptions: negative auditory hallucinations, negative visual hallucinations, negative delusions, negative paranoia  Thought Process: goal directed, linear  Insight/Judgement: fair/fair  Cognition: grossly intact  Attention: intact  Orientation: person, place, time and " situation  Memory: intact    Review of Systems:     Constitutional: Denies fatigue, night sweats  Eyes: Denies double vision, blurred vision  HENT: Denies vertigo, recent head injury  Cardiovascular: Denies chest pain, irregular heartbeats  Respiratory: Denies productive cough, shortness of breath  Gastrointestinal: Denies nausea, vomiting  Genitourinary: Denies dysuria, urinary retention  Integument: Denies hair growth change, new skin lesions  Neurologic: Denies altered mental status, seizures  Musculoskeletal: Denies joint swelling, limitation of motion  Endocrine: Denies cold intolerance, heat intolerance  Psychiatric: Admits anxiety, depression, PTSD. Denies kiersten, obsessive compulsive disorder, psychosis.   Allergic-immunologic: Denies frequent illnesses      Vital Signs:   There were no vitals taken for this visit.     Lab Results:   Ancillary Procedure on 03/17/2022   Component Date Value Ref Range Status   • Target HR (85%) 03/17/2022 165  bpm In process   • Max. Pred. HR (100%) 03/17/2022 194  bpm In process   Lab on 03/02/2022   Component Date Value Ref Range Status   • Prolactin 03/02/2022 18.80  4.79 - 23.30 ng/mL Final   • FSH 03/02/2022 5.58  mIU/mL Final   • LH 03/02/2022 10.50  mIU/mL Final   Lab on 01/11/2022   Component Date Value Ref Range Status   • COVID19 01/11/2022 Not Detected  Not Detected - Ref. Range Final   Office Visit on 12/29/2021   Component Date Value Ref Range Status   • Hepatitis B Surface Ag 12/29/2021 Non-Reactive  Non-Reactive Final   • Hepatitis C Ab 12/29/2021 Non-Reactive  Non-Reactive Final   • HIV-1/ HIV-2 12/29/2021 Non-Reactive  Non-Reactive Final   • HSV 1 IgG, Type Specific 12/29/2021 32.80 (A) 0.00 - 0.90 index Final                                     Negative        <0.91                                   Equivocal 0.91 - 1.09                                   Positive        >1.09   Note: Negative indicates no antibodies detected to   HSV-1. Equivocal may suggest  early infection.  If   clinically appropriate, retest at later date. Positive   indicates antibodies detected to HSV-1.   • HSV 2 IgG 12/29/2021 <0.91  0.00 - 0.90 index Final                                     Negative        <0.91                                   Equivocal 0.91 - 1.09                                   Positive        >1.09   Note: Negative indicates no antibodies detected to   HSV-2. Equivocal may suggest early infection.  If   clinically appropriate, retest at later date. Positive   indicates antibodies detected to HSV-2.   • Treponemal AB IgG 12/29/2021 Non-Reactive  Non-Reactive Final   • Diagnosis 12/29/2021 Comment   Final    NEGATIVE FOR INTRAEPITHELIAL LESION OR MALIGNANCY.   • Specimen adequacy: 12/29/2021 Comment   Final    Satisfactory for evaluation.  Endocervical and/or squamous metaplastic  cells (endocervical component) are present.   • Clinician Provided ICD-10: 12/29/2021 Comment   Final    Z01.419   • Performed by: 12/29/2021 Comment   Final    Raji Webb, Cytotechnologist (ASCP)   • . 12/29/2021 .   Final   • Note: 12/29/2021 Comment   Final    The Pap smear is a screening test designed to aid in the detection of  premalignant and malignant conditions of the uterine cervix.  It is not a  diagnostic procedure and should not be used as the sole means of detecting  cervical cancer.  Both false-positive and false-negative reports do occur.   • Method: 12/29/2021 Comment   Final    This liquid based ThinPrep(R) pap test was screened with the  use of an image guided system.   • Conv .conv 12/29/2021 Comment   Final    The HPV DNA reflex criteria were not met with this specimen result  therefore, no HPV testing was performed.   • Chlamydia, Nuc. Acid Amp 12/29/2021 Negative  Negative Final   • Gonococcus by Nucleic Acid Amp 12/29/2021 Negative  Negative Final   • Trichomonas vaginosis 12/29/2021 Negative  Negative Final   Lab on 09/09/2021   Component Date Value Ref Range Status    • Glucose 09/09/2021 85  65 - 99 mg/dL Final   • BUN 09/09/2021 9  6 - 20 mg/dL Final   • Creatinine 09/09/2021 0.72  0.57 - 1.00 mg/dL Final   • Sodium 09/09/2021 139  136 - 145 mmol/L Final   • Potassium 09/09/2021 4.1  3.5 - 5.2 mmol/L Final   • Chloride 09/09/2021 107  98 - 107 mmol/L Final   • CO2 09/09/2021 21.0 (A) 22.0 - 29.0 mmol/L Final   • Calcium 09/09/2021 9.6  8.6 - 10.5 mg/dL Final   • Total Protein 09/09/2021 6.8  6.0 - 8.5 g/dL Final   • Albumin 09/09/2021 4.50  3.50 - 5.20 g/dL Final   • ALT (SGPT) 09/09/2021 8  1 - 33 U/L Final   • AST (SGOT) 09/09/2021 11  1 - 32 U/L Final   • Alkaline Phosphatase 09/09/2021 47  39 - 117 U/L Final   • Total Bilirubin 09/09/2021 0.3  0.0 - 1.2 mg/dL Final   • eGFR Non  Amer 09/09/2021 98  >60 mL/min/1.73 Final   • Globulin 09/09/2021 2.3  gm/dL Final   • A/G Ratio 09/09/2021 2.0  g/dL Final   • BUN/Creatinine Ratio 09/09/2021 12.5  7.0 - 25.0 Final   • Anion Gap 09/09/2021 11.0  5.0 - 15.0 mmol/L Final   • Total Cholesterol 09/09/2021 143  0 - 200 mg/dL Final   • Triglycerides 09/09/2021 89  0 - 150 mg/dL Final   • HDL Cholesterol 09/09/2021 45  40 - 60 mg/dL Final   • LDL Cholesterol  09/09/2021 81  0 - 100 mg/dL Final   • VLDL Cholesterol 09/09/2021 17  5 - 40 mg/dL Final   • LDL/HDL Ratio 09/09/2021 1.78   Final   • TSH 09/09/2021 2.850  0.270 - 4.200 uIU/mL Final   • 25 Hydroxy, Vitamin D 09/09/2021 47.4  30.0 - 100.0 ng/ml Final   • Hepatitis C Ab 09/09/2021 Non-Reactive  Non-Reactive Final   • WBC 09/09/2021 7.21  3.40 - 10.80 10*3/mm3 Final   • RBC 09/09/2021 4.35  3.77 - 5.28 10*6/mm3 Final   • Hemoglobin 09/09/2021 13.5  12.0 - 15.9 g/dL Final   • Hematocrit 09/09/2021 40.1  34.0 - 46.6 % Final   • MCV 09/09/2021 92.2  79.0 - 97.0 fL Final   • MCH 09/09/2021 31.0  26.6 - 33.0 pg Final   • MCHC 09/09/2021 33.7  31.5 - 35.7 g/dL Final   • RDW 09/09/2021 12.1 (A) 12.3 - 15.4 % Final   • RDW-SD 09/09/2021 40.8  37.0 - 54.0 fl Final   • MPV 09/09/2021  10.9  6.0 - 12.0 fL Final   • Platelets 09/09/2021 275  140 - 450 10*3/mm3 Final   • Neutrophil % 09/09/2021 53.9  42.7 - 76.0 % Final   • Lymphocyte % 09/09/2021 35.5  19.6 - 45.3 % Final   • Monocyte % 09/09/2021 6.1  5.0 - 12.0 % Final   • Eosinophil % 09/09/2021 3.5  0.3 - 6.2 % Final   • Basophil % 09/09/2021 0.7  0.0 - 1.5 % Final   • Immature Grans % 09/09/2021 0.3  0.0 - 0.5 % Final   • Neutrophils, Absolute 09/09/2021 3.89  1.70 - 7.00 10*3/mm3 Final   • Lymphocytes, Absolute 09/09/2021 2.56  0.70 - 3.10 10*3/mm3 Final   • Monocytes, Absolute 09/09/2021 0.44  0.10 - 0.90 10*3/mm3 Final   • Eosinophils, Absolute 09/09/2021 0.25  0.00 - 0.40 10*3/mm3 Final   • Basophils, Absolute 09/09/2021 0.05  0.00 - 0.20 10*3/mm3 Final   • Immature Grans, Absolute 09/09/2021 0.02  0.00 - 0.05 10*3/mm3 Final   • nRBC 09/09/2021 0.0  0.0 - 0.2 /100 WBC Final   Abstract on 05/27/2021   Component Date Value Ref Range Status   •  EYE EXAM 03/23/2021 SEE REPORT   Final   •  EYE EXAM 03/23/2021 SEE REPORT   Final    SEE REPORT   Conversion Encounter on 05/26/2021   Component Date Value Ref Range Status   • Leukocytes, UA 05/26/2021 Trace   Final   • Nitrite, UA 05/26/2021 Negative   Final   • Urobilinogen, UA 05/26/2021 0.2 E.U./dL   Final   • Protein, UA 05/26/2021 Trace   Final   • pH, UA 05/26/2021 5.5   Final   • Blood, UA 05/26/2021 Large   Final   • Specific Gravity, UA 05/26/2021 1.030   Final   • Ketones, UA 05/26/2021 Negative   Final   • Bilirubin, UA 05/26/2021 Negative   Final   • Glucose, UA 05/26/2021 Negative   Final   • Appearance 05/26/2021 Clear   Final   • Color, UA 05/26/2021 Yellow   Final       EKG Results:  No orders to display       Imaging Results:  No Images in the past 120 days found..      Assessment/Plan   Diagnoses and all orders for this visit:    1. Bipolar I disorder, most recent episode depressed (HCC) (Primary)  -     desvenlafaxine (PRISTIQ) 50 MG 24 hr tablet; Take 1 tablet by mouth Daily  for 90 days.  Dispense: 30 tablet; Refill: 2  -     QUEtiapine (SEROquel) 25 MG tablet; Take 1 tablet by mouth Every Night for 90 days.  Dispense: 30 tablet; Refill: 2    2. Generalized anxiety disorder    3. Insomnia due to other mental disorder    4. Post traumatic stress disorder (PTSD)       Continue desvenlafaxine and quetiapine. Has tolerated medications well with no side effects. 5 minutes of supportive psychotherapy with goal to strengthen defenses, promote problems solving, restore adaptive functioning and provide symptom relief. The therapeutic alliance was strengthened to encourage the patient to express their thoughts and feelings. Esteem building was enhanced through praise, reassurance, normalizing and encouragement. Coping skills were enhanced to build distress tolerance skills and emotional regulation. Patient given education on medication side effects, diagnosis/illness and relapse symptoms. Plan to continue supportive psychotherapy in next appointment to provide symptom relief. 3 month follow up    Visit Diagnoses:    ICD-10-CM ICD-9-CM   1. Bipolar I disorder, most recent episode depressed (HCC)  F31.30 296.50   2. Generalized anxiety disorder  F41.1 300.02   3. Insomnia due to other mental disorder  F51.05 300.9    F99 327.02   4. Post traumatic stress disorder (PTSD)  F43.10 309.81       PLAN:  1. Safety: No acute safety concerns.   2. Therapy: Continue [facility]  3. Risk Assessment: Risk of self-harm acutely is moderate.  Risk factors include anxiety disorder, mood disorder, history of self harm and recent psychosocial stressors (pandemic). Protective factors include no family history, denies access to guns/weapons, no present SI, no history of suicide attempts, minimal AODA, healthcare seeking, future orientation, willingness to engage in care.  Risk of self-harm chronically is also moderate, but could be further elevated in the event of treatment  noncompliance and/or AODA.  4. Medications: Continue desvenlafaxine 50 mg p.o. daily to target depression and anxiety.  Discussed all risks, benefits, alternatives, and side effects of Pristiq including but not limited to GI symptoms (N/V/D, constipation), sexual dysfunction, dizziness, insomnia, hyperhidrosis, anxiety, bleeding risk, seizure risk, CNS depression, dyslipidemia, activation of kiersten or hypomania, increased fragility fracture risk, hyponatremia, ocular effects, HTN, withdrawal syndrome following abrupt discontinuation, serotonin syndrome, and activation of suicidal ideation and behavior.  Discussed the need for pt to immediately call the office for any new or worsening symptoms, such as worsening depression; feeling nervous or restless; suicidal thoughts or actions; or other changes changes in mood or behavior, and all other concerns. Pt educated on med compliance and the risks of suddenly stopping this medication or missing doses. Pt verbalized understanding and is agreeable to taking Pristiq. Addressed all questions and concerns. CONTINUE quetiapine 25 mg p.o. nightly to target mood and insomnia.  Risks, benefits, alternatives discussed with patient including nausea and vomiting, GI upset, sedation, akathisia, hypotension, increased appetite, lowering of seizure threshold, theoretical risk of tardive dyskinesia, extrapyramidal symptoms, restless legs syndrome. After discussion of these risks and benefits, the patient voiced understanding and agreed to proceed.    5. Labs/studies: No labs/studies ordered at this time  6. Follow-up: 3 months      TREATMENT PLAN/GOALS: Continue supportive psychotherapy efforts and medications as indicated. Treatment and medication options discussed during today's visit. Patient ackowledged and verbally consented to continue with current treatment plan and was educated on the importance of compliance with treatment and follow-up appointments.    MEDICATION  ISSUES:  RODNEY reviewed as expected.  Discussed medication options and treatment plan of prescribed medication as well as the risks, benefits, and side effects including potential falls, possible impaired driving and metabolic adversities among others. Patient is agreeable to call the office with any worsening of symptoms or onset of side effects. Patient is agreeable to call 911 or go to the nearest ER should he/she begin having SI/HI. No medication side effects or related complaints today.     MEDS ORDERED DURING VISIT:  New Medications Ordered This Visit   Medications   • desvenlafaxine (PRISTIQ) 50 MG 24 hr tablet     Sig: Take 1 tablet by mouth Daily for 90 days.     Dispense:  30 tablet     Refill:  2   • QUEtiapine (SEROquel) 25 MG tablet     Sig: Take 1 tablet by mouth Every Night for 90 days.     Dispense:  30 tablet     Refill:  2       3 month follow up       This document has been electronically signed by LACHELLE Conde  March 30, 2022 09:23 EDT      Part of this note may be an electronic transcription/translation of spoken language to printed text using the Dragon Dictation System.

## 2022-04-25 DIAGNOSIS — E03.9 HYPOTHYROIDISM, UNSPECIFIED TYPE: ICD-10-CM

## 2022-04-25 RX ORDER — LEVOTHYROXINE SODIUM 0.05 MG/1
50 TABLET ORAL DAILY
Qty: 90 TABLET | Refills: 2 | Status: SHIPPED | OUTPATIENT
Start: 2022-04-25 | End: 2022-07-26 | Stop reason: SDUPTHER

## 2022-04-28 ENCOUNTER — HOSPITAL ENCOUNTER (OUTPATIENT)
Dept: ULTRASOUND IMAGING | Facility: HOSPITAL | Age: 27
Discharge: HOME | End: 2022-04-28

## 2022-04-28 DIAGNOSIS — O92.79 LACTATION SYMPTOM: ICD-10-CM

## 2022-04-28 PROCEDURE — 76642 ULTRASOUND BREAST LIMITED: CPT

## 2022-05-24 DIAGNOSIS — F31.30 BIPOLAR I DISORDER, MOST RECENT EPISODE DEPRESSED: ICD-10-CM

## 2022-05-24 RX ORDER — QUETIAPINE FUMARATE 25 MG/1
25 TABLET, FILM COATED ORAL NIGHTLY
Qty: 30 TABLET | Refills: 2 | Status: SHIPPED | OUTPATIENT
Start: 2022-05-24 | End: 2022-11-30

## 2022-05-24 RX ORDER — DESVENLAFAXINE SUCCINATE 50 MG/1
50 TABLET, EXTENDED RELEASE ORAL DAILY
Qty: 30 TABLET | Refills: 2 | Status: SHIPPED | OUTPATIENT
Start: 2022-05-24 | End: 2022-11-30

## 2022-05-24 NOTE — TELEPHONE ENCOUNTER
Pt called to transfer prescriptions to [facility] [address] for both Seroquel 25mg and Pristiq 50mg. Upcoming appt 06/30/2022. Medications reordered and pended to be sent to updated pharmacy.

## 2022-07-11 ENCOUNTER — TELEPHONE (OUTPATIENT)
Dept: FAMILY MEDICINE CLINIC | Facility: CLINIC | Age: 27
End: 2022-07-11

## 2022-07-11 DIAGNOSIS — J45.909 ASTHMA, UNSPECIFIED ASTHMA SEVERITY, UNSPECIFIED WHETHER COMPLICATED, UNSPECIFIED WHETHER PERSISTENT: ICD-10-CM

## 2022-07-11 DIAGNOSIS — G43.109 MIGRAINE WITH AURA AND WITHOUT STATUS MIGRAINOSUS, NOT INTRACTABLE: Chronic | ICD-10-CM

## 2022-07-11 DIAGNOSIS — R00.0 TACHYCARDIA: Chronic | ICD-10-CM

## 2022-07-11 DIAGNOSIS — E55.9 VITAMIN D DEFICIENCY: ICD-10-CM

## 2022-07-11 RX ORDER — PROPRANOLOL HYDROCHLORIDE 20 MG/1
20 TABLET ORAL 3 TIMES DAILY
Qty: 60 TABLET | Refills: 5 | Status: SHIPPED | OUTPATIENT
Start: 2022-07-11 | End: 2022-08-29 | Stop reason: SDUPTHER

## 2022-07-11 RX ORDER — ALBUTEROL SULFATE 90 UG/1
2 AEROSOL, METERED RESPIRATORY (INHALATION) EVERY 6 HOURS PRN
Qty: 18 G | Refills: 6 | Status: SHIPPED | OUTPATIENT
Start: 2022-07-11 | End: 2022-08-29 | Stop reason: SDUPTHER

## 2022-07-26 DIAGNOSIS — E03.9 HYPOTHYROIDISM, UNSPECIFIED TYPE: ICD-10-CM

## 2022-07-26 DIAGNOSIS — J30.9 ALLERGIC RHINITIS, UNSPECIFIED SEASONALITY, UNSPECIFIED TRIGGER: ICD-10-CM

## 2022-07-26 RX ORDER — FEXOFENADINE HCL AND PSEUDOEPHEDRINE HCI 180; 240 MG/1; MG/1
1 TABLET, EXTENDED RELEASE ORAL DAILY
Qty: 90 TABLET | Refills: 0 | Status: SHIPPED | OUTPATIENT
Start: 2022-07-26 | End: 2022-08-29 | Stop reason: SDUPTHER

## 2022-07-26 RX ORDER — LEVOTHYROXINE SODIUM 0.05 MG/1
50 TABLET ORAL DAILY
Qty: 90 TABLET | Refills: 0 | Status: SHIPPED | OUTPATIENT
Start: 2022-07-26 | End: 2022-08-29 | Stop reason: SDUPTHER

## 2022-07-26 RX ORDER — MONTELUKAST SODIUM 10 MG/1
10 TABLET ORAL EVERY EVENING
Qty: 90 TABLET | Refills: 0 | Status: SHIPPED | OUTPATIENT
Start: 2022-07-26 | End: 2022-08-29 | Stop reason: SDUPTHER

## 2022-08-04 ENCOUNTER — TELEMEDICINE (OUTPATIENT)
Dept: PSYCHIATRY | Facility: CLINIC | Age: 27
End: 2022-08-04

## 2022-08-04 DIAGNOSIS — F41.1 GENERALIZED ANXIETY DISORDER: ICD-10-CM

## 2022-08-04 DIAGNOSIS — F99 INSOMNIA DUE TO OTHER MENTAL DISORDER: ICD-10-CM

## 2022-08-04 DIAGNOSIS — F43.10 POST TRAUMATIC STRESS DISORDER (PTSD): ICD-10-CM

## 2022-08-04 DIAGNOSIS — F31.30 BIPOLAR I DISORDER, MOST RECENT EPISODE DEPRESSED: Primary | ICD-10-CM

## 2022-08-04 DIAGNOSIS — F51.05 INSOMNIA DUE TO OTHER MENTAL DISORDER: ICD-10-CM

## 2022-08-04 PROCEDURE — 99214 OFFICE O/P EST MOD 30 MIN: CPT | Performed by: NURSE PRACTITIONER

## 2022-08-04 NOTE — TREATMENT PLAN
Multi-Disciplinary Problems (from Behavioral Health Treatment Plan)    Active Problems     Problem: Anxiety  Start Date: 08/04/22    Problem Details: The patient self-scales this problem as a 3 with 10 being the worst.        Goal Priority Start Date Expected End Date End Date    Patient will develop and implement behavioral and cognitive strategies to reduce anxiety and irrational fears. -- 08/04/22 -- --    Goal Details: Progress toward goal:  Not appropriate to rate progress toward goal since this is the initial treatment plan.        Goal Intervention Frequency Start Date End Date    Help patient explore past emotional issues in relation to present anxiety. Weekly 08/04/22 --    Intervention Details: Duration of treatment until until remission of symptoms.        Goal Intervention Frequency Start Date End Date    Help patient develop an awareness of their cognitive and physical responses to anxiety. Weekly 08/04/22 --    Intervention Details: Duration of treatment until until remission of symptoms.                    Reviewed By     Te Beard APRN 08/04/22 [id number]                 I have discussed and reviewed this treatment plan with the patient.

## 2022-08-04 NOTE — PROGRESS NOTES
Subjective   Maurice Lazo is a 26 y.o. female who presents today for follow up. Mode of visit: Video  Location of provider: [address], [place] [address].  Location of patient: Home  Does the patient consent to use a video/audio connection for your medical care today? Yes  The visit included audio and video interaction. No technical issues occurred during this visit.    Chief Complaint:  Bipolar Disorder, anxiety, PTSD    History of Present Illness:   Depression over the past month- has been good  Sleep- good  Interest- Denies anhedonia  Guilt- Denies  Energy- low   Concentration- denies  Appetite- good  Psychomotor retardation/agitation- Denies  Suicidal thoughts or hopelessness- denies hopelessness, si or hi.     Anxiety over the past month- has been up at times  Anxious, nervous or worried on most days about a number of events or activities- less worries  No control over worries- able to manage better- working on mindfulness  Irritability- denies  Concentration- see above  Sleep- see above  Restlessness- denies  Tension in muscles- endorses    Denies symptoms of kiersten or hypomania.     Access to Firearms: Denies    Past Surgical History:  Past Surgical History:   Procedure Laterality Date   • DILATATION AND CURETTAGE     • OTHER SURGICAL HISTORY      Surgical Clips   • ROTATOR CUFF REPAIR     • SHOULDER SURGERY     • WISDOM TOOTH EXTRACTION         Problem List:  Patient Active Problem List   Diagnosis   • Facial aging   • Anxiety   • Asthma   • Bipolar 1 disorder (HCC)   • Depression   • Hashimoto's thyroiditis   • Migraine headache   • Nicotine dependence   • Predisposition to allergic reaction   • Hypothyroidism   • Thyroid disorder   • Facial aging   • Well woman exam with routine gynecological exam       Allergy:   Allergies   Allergen Reactions   • Meperidine Hives and Nausea And Vomiting   • Lamictal [Lamotrigine] Rash   • Tegaderm Ag Mesh [Silver] Rash        Discontinued  Medications:  There are no discontinued medications.    Current Medications:   Current Outpatient Medications   Medication Sig Dispense Refill   • albuterol sulfate  (90 Base) MCG/ACT inhaler Inhale 2 puffs Every 6 (Six) Hours As Needed for Wheezing. 18 g 6   • Budeson-Glycopyrrol-Formoterol (Breztri Aerosphere) 160-9-4.8 MCG/ACT aerosol inhaler Inhale 2 puffs 2 (Two) Times a Day. 10.7 g 3   • cholecalciferol (VITAMIN D3) 1.25 MG (20288 UT) capsule Take 1 capsule by mouth 1 (One) Time Per Week. 13 capsule 3   • cyclobenzaprine (FLEXERIL) 5 MG tablet cyclobenzaprine 5 mg oral tablet take 1 tablet (5 mg) by oral route BID prn 4/13/2021  Active     • desvenlafaxine (PRISTIQ) 50 MG 24 hr tablet Take 1 tablet by mouth Daily for 90 days. 30 tablet 2   • fexofenadine-pseudoephedrine (Allegra-D Allergy & Congestion) 180-240 MG per 24 hr tablet Take 1 tablet by mouth Daily. 90 tablet 0   • fluticasone (FLONASE) 50 MCG/ACT nasal spray 1 spray into the nostril(s) as directed by provider 2 (Two) Times a Day for 30 days. (Patient taking differently: 1 spray into the nostril(s) as directed by provider 2 (Two) Times a Day As Needed.) 16 g 0   • levothyroxine (SYNTHROID, LEVOTHROID) 50 MCG tablet Take 1 tablet by mouth Daily. 90 tablet 0   • medroxyPROGESTERone (Depo-Provera) 150 MG/ML injection Inject 1 mL into the appropriate muscle as directed by prescriber Every 3 (Three) Months. 1 mL 3   • montelukast (SINGULAIR) 10 MG tablet Take 1 tablet by mouth Every Evening. 90 tablet 0   • propranolol (INDERAL) 20 MG tablet Take 1 tablet by mouth 3 (Three) Times a Day. 60 tablet 5   • QUEtiapine (SEROquel) 25 MG tablet Take 1 tablet by mouth Every Night for 90 days. 30 tablet 2   • Rimegepant Sulfate (Nurtec) 75 MG tablet dispersible tablet Take 1 tablet by mouth Daily As Needed (Headache). 8 tablet 5     No current facility-administered medications for this visit.       Past Medical History:  Past Medical History:   Diagnosis  "Date   • Allergy    • Anxiety    • Asthma    • Bipolar 1 disorder (HCC)    • Depression    • Facial aging    • Gastroesophageal reflux disease    • Migraine headache    • Nicotine dependence 2021   • Thyroid disorder        Past Psychiatric History:  Medication Trials: Abilify, Rexulti and Vraylar for mood, with no benefits.  Prozac, Zoloft and Trintellix for depression and anxiety with little benefit.    Substance Abuse History:   Types: Denies  Withdrawal Symptoms: Denies  Longest Period Sober: Denies  AA: Denies      Social History     Socioeconomic History   • Marital status: Single   Tobacco Use   • Smoking status: Former Smoker     Packs/day: 0.50     Years: 2.00     Pack years: 1.00     Types: Cigarettes     Quit date:      Years since quittin.5   • Smokeless tobacco: Never Used   • Tobacco comment: former, 1 packs per day, smoked for 6-10 years   Vaping Use   • Vaping Use: Never used   Substance and Sexual Activity   • Alcohol use: Yes     Comment: Current some day  rarely drinks   • Drug use: Never   • Sexual activity: Yes     Birth control/protection: Injection         Family History   Problem Relation Age of Onset   • Stroke Mother    • Lung cancer Maternal Grandmother    • Heart disease Other    • Anxiety disorder Father    • Bipolar disorder Paternal Aunt    • Schizophrenia Paternal Aunt        Mental Status Exam:     Appearance: good eye contact, normal street clothes, groomed, sitting in chair   Behavior: pleasant and cooperative  Motor: no abnormal  Speech: normal rhythm, rate, volume, tone, not hyperverbal, not pressured, normal prosidy  Mood: \"Better\"  Affect: euthymic  Thought Content: negative suicidal ideations, negative homicidal ideations, negative obsessions  Perceptions: negative auditory hallucinations, negative visual hallucinations, negative delusions, negative paranoia  Thought Process: goal directed, linear  Insight/Judgement: fair/fair  Cognition: grossly " intact  Attention: intact  Orientation: person, place, time and situation  Memory: intact    Review of Systems:     Constitutional: Denies fatigue, night sweats  Eyes: Denies double vision, blurred vision  HENT: Denies vertigo, recent head injury  Cardiovascular: Denies chest pain, irregular heartbeats  Respiratory: Denies productive cough, shortness of breath  Gastrointestinal: Denies nausea, vomiting  Genitourinary: Denies dysuria, urinary retention  Integument: Denies hair growth change, new skin lesions  Neurologic: Denies altered mental status, seizures  Musculoskeletal: Denies joint swelling, limitation of motion  Endocrine: Denies cold intolerance, heat intolerance  Psychiatric: Admits anxiety, depression, PTSD. Denies kiersten, obsessive compulsive disorder, psychosis.   Allergic-immunologic: Denies frequent illnesses      Vital Signs:   There were no vitals taken for this visit.     Lab Results:   Ancillary Procedure on 03/17/2022   Component Date Value Ref Range Status   • Target HR (85%) 03/17/2022 165  bpm Final   • Max. Pred. HR (100%) 03/17/2022 194  bpm Final   Lab on 03/02/2022   Component Date Value Ref Range Status   • Prolactin 03/02/2022 18.80  4.79 - 23.30 ng/mL Final   • FSH 03/02/2022 5.58  mIU/mL Final   • LH 03/02/2022 10.50  mIU/mL Final   Lab on 01/11/2022   Component Date Value Ref Range Status   • COVID19 01/11/2022 Not Detected  Not Detected - Ref. Range Final   Office Visit on 12/29/2021   Component Date Value Ref Range Status   • Hepatitis B Surface Ag 12/29/2021 Non-Reactive  Non-Reactive Final   • Hepatitis C Ab 12/29/2021 Non-Reactive  Non-Reactive Final   • HIV-1/ HIV-2 12/29/2021 Non-Reactive  Non-Reactive Final   • HSV 1 IgG, Type Specific 12/29/2021 32.80 (A) 0.00 - 0.90 index Final                                     Negative        <0.91                                   Equivocal 0.91 - 1.09                                   Positive        >1.09   Note: Negative indicates no  antibodies detected to   HSV-1. Equivocal may suggest early infection.  If   clinically appropriate, retest at later date. Positive   indicates antibodies detected to HSV-1.   • HSV 2 IgG 12/29/2021 <0.91  0.00 - 0.90 index Final                                     Negative        <0.91                                   Equivocal 0.91 - 1.09                                   Positive        >1.09   Note: Negative indicates no antibodies detected to   HSV-2. Equivocal may suggest early infection.  If   clinically appropriate, retest at later date. Positive   indicates antibodies detected to HSV-2.   • Treponemal AB IgG 12/29/2021 Non-Reactive  Non-Reactive Final   • Diagnosis 12/29/2021 Comment   Final    NEGATIVE FOR INTRAEPITHELIAL LESION OR MALIGNANCY.   • Specimen adequacy: 12/29/2021 Comment   Final    Satisfactory for evaluation.  Endocervical and/or squamous metaplastic  cells (endocervical component) are present.   • Clinician Provided ICD-10: 12/29/2021 Comment   Final    Z01.419   • Performed by: 12/29/2021 Comment   Final    Raji Webb, Cytotechnologist (ASCP)   • . 12/29/2021 .   Final   • Note: 12/29/2021 Comment   Final    The Pap smear is a screening test designed to aid in the detection of  premalignant and malignant conditions of the uterine cervix.  It is not a  diagnostic procedure and should not be used as the sole means of detecting  cervical cancer.  Both false-positive and false-negative reports do occur.   • Method: 12/29/2021 Comment   Final    This liquid based ThinPrep(R) pap test was screened with the  use of an image guided system.   • Conv .conv 12/29/2021 Comment   Final    The HPV DNA reflex criteria were not met with this specimen result  therefore, no HPV testing was performed.   • Chlamydia, Nuc. Acid Amp 12/29/2021 Negative  Negative Final   • Gonococcus by Nucleic Acid Amp 12/29/2021 Negative  Negative Final   • Trichomonas vaginosis 12/29/2021 Negative  Negative Final   Lab on  09/09/2021   Component Date Value Ref Range Status   • Glucose 09/09/2021 85  65 - 99 mg/dL Final   • BUN 09/09/2021 9  6 - 20 mg/dL Final   • Creatinine 09/09/2021 0.72  0.57 - 1.00 mg/dL Final   • Sodium 09/09/2021 139  136 - 145 mmol/L Final   • Potassium 09/09/2021 4.1  3.5 - 5.2 mmol/L Final   • Chloride 09/09/2021 107  98 - 107 mmol/L Final   • CO2 09/09/2021 21.0 (A) 22.0 - 29.0 mmol/L Final   • Calcium 09/09/2021 9.6  8.6 - 10.5 mg/dL Final   • Total Protein 09/09/2021 6.8  6.0 - 8.5 g/dL Final   • Albumin 09/09/2021 4.50  3.50 - 5.20 g/dL Final   • ALT (SGPT) 09/09/2021 8  1 - 33 U/L Final   • AST (SGOT) 09/09/2021 11  1 - 32 U/L Final   • Alkaline Phosphatase 09/09/2021 47  39 - 117 U/L Final   • Total Bilirubin 09/09/2021 0.3  0.0 - 1.2 mg/dL Final   • eGFR Non  Amer 09/09/2021 98  >60 mL/min/1.73 Final   • Globulin 09/09/2021 2.3  gm/dL Final   • A/G Ratio 09/09/2021 2.0  g/dL Final   • BUN/Creatinine Ratio 09/09/2021 12.5  7.0 - 25.0 Final   • Anion Gap 09/09/2021 11.0  5.0 - 15.0 mmol/L Final   • Total Cholesterol 09/09/2021 143  0 - 200 mg/dL Final   • Triglycerides 09/09/2021 89  0 - 150 mg/dL Final   • HDL Cholesterol 09/09/2021 45  40 - 60 mg/dL Final   • LDL Cholesterol  09/09/2021 81  0 - 100 mg/dL Final   • VLDL Cholesterol 09/09/2021 17  5 - 40 mg/dL Final   • LDL/HDL Ratio 09/09/2021 1.78   Final   • TSH 09/09/2021 2.850  0.270 - 4.200 uIU/mL Final   • 25 Hydroxy, Vitamin D 09/09/2021 47.4  30.0 - 100.0 ng/ml Final   • Hepatitis C Ab 09/09/2021 Non-Reactive  Non-Reactive Final   • WBC 09/09/2021 7.21  3.40 - 10.80 10*3/mm3 Final   • RBC 09/09/2021 4.35  3.77 - 5.28 10*6/mm3 Final   • Hemoglobin 09/09/2021 13.5  12.0 - 15.9 g/dL Final   • Hematocrit 09/09/2021 40.1  34.0 - 46.6 % Final   • MCV 09/09/2021 92.2  79.0 - 97.0 fL Final   • MCH 09/09/2021 31.0  26.6 - 33.0 pg Final   • MCHC 09/09/2021 33.7  31.5 - 35.7 g/dL Final   • RDW 09/09/2021 12.1 (A) 12.3 - 15.4 % Final   • RDW-SD  09/09/2021 40.8  37.0 - 54.0 fl Final   • MPV 09/09/2021 10.9  6.0 - 12.0 fL Final   • Platelets 09/09/2021 275  140 - 450 10*3/mm3 Final   • Neutrophil % 09/09/2021 53.9  42.7 - 76.0 % Final   • Lymphocyte % 09/09/2021 35.5  19.6 - 45.3 % Final   • Monocyte % 09/09/2021 6.1  5.0 - 12.0 % Final   • Eosinophil % 09/09/2021 3.5  0.3 - 6.2 % Final   • Basophil % 09/09/2021 0.7  0.0 - 1.5 % Final   • Immature Grans % 09/09/2021 0.3  0.0 - 0.5 % Final   • Neutrophils, Absolute 09/09/2021 3.89  1.70 - 7.00 10*3/mm3 Final   • Lymphocytes, Absolute 09/09/2021 2.56  0.70 - 3.10 10*3/mm3 Final   • Monocytes, Absolute 09/09/2021 0.44  0.10 - 0.90 10*3/mm3 Final   • Eosinophils, Absolute 09/09/2021 0.25  0.00 - 0.40 10*3/mm3 Final   • Basophils, Absolute 09/09/2021 0.05  0.00 - 0.20 10*3/mm3 Final   • Immature Grans, Absolute 09/09/2021 0.02  0.00 - 0.05 10*3/mm3 Final   • nRBC 09/09/2021 0.0  0.0 - 0.2 /100 WBC Final       EKG Results:  No orders to display       Imaging Results:  No Images in the past 120 days found..      Assessment & Plan   Diagnoses and all orders for this visit:    1. Bipolar I disorder, most recent episode depressed (HCC) (Primary)    2. Generalized anxiety disorder    3. Insomnia due to other mental disorder    4. Post traumatic stress disorder (PTSD)       Continue desvenlafaxine and quetiapine. Has tolerated medications well with no side effects. 5 minutes of supportive psychotherapy with goal to strengthen defenses, promote problems solving, restore adaptive functioning and provide symptom relief. The therapeutic alliance was strengthened to encourage the patient to express their thoughts and feelings. Esteem building was enhanced through praise, reassurance, normalizing and encouragement. Coping skills were enhanced to build distress tolerance skills and emotional regulation. Patient given education on medication side effects, diagnosis/illness and relapse symptoms. Plan to continue supportive  psychotherapy in next appointment to provide symptom relief. 3 month follow up    Visit Diagnoses:    ICD-10-CM ICD-9-CM   1. Bipolar I disorder, most recent episode depressed (HCC)  F31.30 296.50   2. Generalized anxiety disorder  F41.1 300.02   3. Insomnia due to other mental disorder  F51.05 300.9    F99 327.02   4. Post traumatic stress disorder (PTSD)  F43.10 309.81       PLAN:  1. Safety: No acute safety concerns.   2. Therapy: Continue [place] at [facility]  3. Risk Assessment: Risk of self-harm acutely is moderate.  Risk factors include anxiety disorder, mood disorder, history of self harm and recent psychosocial stressors (pandemic). Protective factors include no family history, denies access to guns/weapons, no present SI, no history of suicide attempts, minimal AODA, healthcare seeking, future orientation, willingness to engage in care.  Risk of self-harm chronically is also moderate, but could be further elevated in the event of treatment noncompliance and/or AODA.  4. Medications: Continue desvenlafaxine 50 mg p.o. daily to target depression and anxiety.  Discussed all risks, benefits, alternatives, and side effects of Pristiq including but not limited to GI symptoms (N/V/D, constipation), sexual dysfunction, dizziness, insomnia, hyperhidrosis, anxiety, bleeding risk, seizure risk, CNS depression, dyslipidemia, activation of kiersten or hypomania, increased fragility fracture risk, hyponatremia, ocular effects, HTN, withdrawal syndrome following abrupt discontinuation, serotonin syndrome, and activation of suicidal ideation and behavior.  Discussed the need for pt to immediately call the office for any new or worsening symptoms, such as worsening depression; feeling nervous or restless; suicidal thoughts or actions; or other changes changes in mood or behavior, and all other concerns. Pt educated on med compliance and the risks of suddenly stopping this medication or missing doses.  Pt verbalized understanding and is agreeable to taking Pristiq. Addressed all questions and concerns. CONTINUE quetiapine 25 mg p.o. nightly to target mood and insomnia.  Risks, benefits, alternatives discussed with patient including nausea and vomiting, GI upset, sedation, akathisia, hypotension, increased appetite, lowering of seizure threshold, theoretical risk of tardive dyskinesia, extrapyramidal symptoms, restless legs syndrome. After discussion of these risks and benefits, the patient voiced understanding and agreed to proceed.    5. Labs/studies: No labs/studies ordered at this time  6. Follow-up: 3 months      TREATMENT PLAN/GOALS: Continue supportive psychotherapy efforts and medications as indicated. Treatment and medication options discussed during today's visit. Patient ackowledged and verbally consented to continue with current treatment plan and was educated on the importance of compliance with treatment and follow-up appointments.    MEDICATION ISSUES:  RODNEY reviewed as expected.  Discussed medication options and treatment plan of prescribed medication as well as the risks, benefits, and side effects including potential falls, possible impaired driving and metabolic adversities among others. Patient is agreeable to call the office with any worsening of symptoms or onset of side effects. Patient is agreeable to call 911 or go to the nearest ER should he/she begin having SI/HI. No medication side effects or related complaints today.     MEDS ORDERED DURING VISIT:  No orders of the defined types were placed in this encounter.      3 month follow up       This document has been electronically signed by LACHELLE Conde  August 4, 2022 12:59 EDT      Part of this note may be an electronic transcription/translation of spoken language to printed text using the Dragon Dictation System.

## 2022-08-29 ENCOUNTER — OFFICE VISIT (OUTPATIENT)
Dept: FAMILY MEDICINE CLINIC | Facility: CLINIC | Age: 27
End: 2022-08-29

## 2022-08-29 VITALS
HEIGHT: 70 IN | SYSTOLIC BLOOD PRESSURE: 125 MMHG | OXYGEN SATURATION: 100 % | DIASTOLIC BLOOD PRESSURE: 85 MMHG | WEIGHT: 144 LBS | BODY MASS INDEX: 20.62 KG/M2 | HEART RATE: 92 BPM

## 2022-08-29 DIAGNOSIS — R00.0 TACHYCARDIA: Chronic | ICD-10-CM

## 2022-08-29 DIAGNOSIS — Z13.220 SCREENING FOR LIPID DISORDERS: ICD-10-CM

## 2022-08-29 DIAGNOSIS — R05.8 COUGH WITH EXPOSURE TO COVID-19 VIRUS: ICD-10-CM

## 2022-08-29 DIAGNOSIS — E03.9 HYPOTHYROIDISM, UNSPECIFIED TYPE: ICD-10-CM

## 2022-08-29 DIAGNOSIS — J45.909 ASTHMA, UNSPECIFIED ASTHMA SEVERITY, UNSPECIFIED WHETHER COMPLICATED, UNSPECIFIED WHETHER PERSISTENT: ICD-10-CM

## 2022-08-29 DIAGNOSIS — R53.83 FATIGUE, UNSPECIFIED TYPE: Primary | ICD-10-CM

## 2022-08-29 DIAGNOSIS — F31.9 BIPOLAR 1 DISORDER: ICD-10-CM

## 2022-08-29 DIAGNOSIS — Z20.822 COUGH WITH EXPOSURE TO COVID-19 VIRUS: ICD-10-CM

## 2022-08-29 DIAGNOSIS — G43.109 MIGRAINE WITH AURA AND WITHOUT STATUS MIGRAINOSUS, NOT INTRACTABLE: Chronic | ICD-10-CM

## 2022-08-29 DIAGNOSIS — J30.9 ALLERGIC RHINITIS, UNSPECIFIED SEASONALITY, UNSPECIFIED TRIGGER: Chronic | ICD-10-CM

## 2022-08-29 DIAGNOSIS — E55.9 VITAMIN D DEFICIENCY: ICD-10-CM

## 2022-08-29 PROCEDURE — 99214 OFFICE O/P EST MOD 30 MIN: CPT | Performed by: PHYSICIAN ASSISTANT

## 2022-08-29 RX ORDER — ALBUTEROL SULFATE 90 UG/1
2 AEROSOL, METERED RESPIRATORY (INHALATION) EVERY 6 HOURS PRN
Qty: 18 G | Refills: 6 | Status: SHIPPED | OUTPATIENT
Start: 2022-08-29

## 2022-08-29 RX ORDER — MONTELUKAST SODIUM 10 MG/1
10 TABLET ORAL EVERY EVENING
Qty: 90 TABLET | Refills: 1 | Status: SHIPPED | OUTPATIENT
Start: 2022-08-29

## 2022-08-29 RX ORDER — FEXOFENADINE HCL AND PSEUDOEPHEDRINE HCI 180; 240 MG/1; MG/1
1 TABLET, EXTENDED RELEASE ORAL DAILY
Qty: 90 TABLET | Refills: 1 | Status: SHIPPED | OUTPATIENT
Start: 2022-08-29 | End: 2023-01-03

## 2022-08-29 RX ORDER — RIMEGEPANT SULFATE 75 MG/75MG
75 TABLET, ORALLY DISINTEGRATING ORAL DAILY PRN
Qty: 8 TABLET | Refills: 5 | Status: SHIPPED | OUTPATIENT
Start: 2022-08-29

## 2022-08-29 RX ORDER — LEVOTHYROXINE SODIUM 0.05 MG/1
50 TABLET ORAL DAILY
Qty: 90 TABLET | Refills: 1 | Status: SHIPPED | OUTPATIENT
Start: 2022-08-29

## 2022-08-29 RX ORDER — BUDESONIDE, GLYCOPYRROLATE, AND FORMOTEROL FUMARATE 160; 9; 4.8 UG/1; UG/1; UG/1
2 AEROSOL, METERED RESPIRATORY (INHALATION) 2 TIMES DAILY
Qty: 10.7 G | Refills: 3 | Status: SHIPPED | OUTPATIENT
Start: 2022-08-29

## 2022-08-29 RX ORDER — PROPRANOLOL HYDROCHLORIDE 20 MG/1
20 TABLET ORAL 3 TIMES DAILY
Qty: 90 TABLET | Refills: 5 | Status: SHIPPED | OUTPATIENT
Start: 2022-08-29

## 2022-08-29 NOTE — PROGRESS NOTES
Chief Complaint  Hypothyroidism (6 month follow up), Anxiety, and Migraine    SUBJECTIVE  Maurice Lazo presents to [facility]    History of Present Illness     Pt presents today for 6 month follow up.    Pt states she would like to discuss getting a referral for a different psych. Pt is currently established with Te Beard.    Pt states she would like to get her labs checked due to feeling fatigue.    Requesting refills.    Labs 9/9/2  LDL 81  TSH 2.85    No CP, SOA, HA    Past Medical History:   Diagnosis Date   • Allergy    • Anxiety    • Asthma    • Bipolar 1 disorder (HCC)    • Depression    • Facial aging    • Gastroesophageal reflux disease    • Migraine headache    • Nicotine dependence 04/08/2021   • Thyroid disorder       Family History   Problem Relation Age of Onset   • Stroke Mother    • Lung cancer Maternal Grandmother    • Heart disease Other    • Anxiety disorder Father    • Bipolar disorder Paternal Aunt    • Schizophrenia Paternal Aunt       Past Surgical History:   Procedure Laterality Date   • DILATATION AND CURETTAGE     • OTHER SURGICAL HISTORY      Surgical Clips   • ROTATOR CUFF REPAIR     • SHOULDER SURGERY     • WISDOM TOOTH EXTRACTION          Current Outpatient Medications:   •  albuterol sulfate  (90 Base) MCG/ACT inhaler, Inhale 2 puffs Every 6 (Six) Hours As Needed for Wheezing., Disp: 18 g, Rfl: 6  •  Budeson-Glycopyrrol-Formoterol (Breztri Aerosphere) 160-9-4.8 MCG/ACT aerosol inhaler, Inhale 2 puffs 2 (Two) Times a Day., Disp: 10.7 g, Rfl: 3  •  cholecalciferol (VITAMIN D3) 1.25 MG (08102 UT) capsule, Take 1 capsule by mouth 1 (One) Time Per Week., Disp: 13 capsule, Rfl: 3  •  cyclobenzaprine (FLEXERIL) 5 MG tablet, cyclobenzaprine 5 mg oral tablet take 1 tablet (5 mg) by oral route BID prn 4/13/2021  Active, Disp: , Rfl:   •  fexofenadine-pseudoephedrine (Allegra-D Allergy & Congestion) 180-240 MG per 24 hr tablet, Take 1 tablet by mouth  "Daily., Disp: 90 tablet, Rfl: 1  •  levothyroxine (SYNTHROID, LEVOTHROID) 50 MCG tablet, Take 1 tablet by mouth Daily., Disp: 90 tablet, Rfl: 1  •  medroxyPROGESTERone (Depo-Provera) 150 MG/ML injection, Inject 1 mL into the appropriate muscle as directed by prescriber Every 3 (Three) Months., Disp: 1 mL, Rfl: 3  •  montelukast (SINGULAIR) 10 MG tablet, Take 1 tablet by mouth Every Evening., Disp: 90 tablet, Rfl: 1  •  propranolol (INDERAL) 20 MG tablet, Take 1 tablet by mouth 3 (Three) Times a Day., Disp: 90 tablet, Rfl: 5  •  Rimegepant Sulfate (Nurtec) 75 MG tablet dispersible tablet, Take 1 tablet by mouth Daily As Needed (Headache)., Disp: 8 tablet, Rfl: 5  •  desvenlafaxine (PRISTIQ) 50 MG 24 hr tablet, Take 1 tablet by mouth Daily for 90 days., Disp: 30 tablet, Rfl: 2  •  fluticasone (FLONASE) 50 MCG/ACT nasal spray, 1 spray into the nostril(s) as directed by provider 2 (Two) Times a Day for 30 days. (Patient taking differently: 1 spray into the nostril(s) as directed by provider 2 (Two) Times a Day As Needed.), Disp: 16 g, Rfl: 0  •  QUEtiapine (SEROquel) 25 MG tablet, Take 1 tablet by mouth Every Night for 90 days., Disp: 30 tablet, Rfl: 2    OBJECTIVE  Vital Signs:   /85 (BP Location: Left arm)   Pulse 92   Ht 177.8 cm (70\")   Wt 65.3 kg (144 lb)   SpO2 100%   BMI 20.66 kg/m²    Estimated body mass index is 20.66 kg/m² as calculated from the following:    Height as of this encounter: 177.8 cm (70\").    Weight as of this encounter: 65.3 kg (144 lb).     Wt Readings from Last 3 Encounters:   08/29/22 65.3 kg (144 lb)   03/16/22 62.6 kg (138 lb)   02/28/22 63.4 kg (139 lb 12.8 oz)     BP Readings from Last 3 Encounters:   08/29/22 125/85   03/16/22 126/81   02/28/22 109/78     Physical Exam  Vitals and nursing note reviewed.   Constitutional:       Appearance: Normal appearance.   HENT:      Head: Normocephalic and atraumatic.   Cardiovascular:      Rate and Rhythm: Normal rate and regular rhythm. "      Heart sounds: Normal heart sounds.   Pulmonary:      Effort: Pulmonary effort is normal.      Breath sounds: Normal breath sounds.   Musculoskeletal:      Cervical back: Neck supple.   Neurological:      Mental Status: She is alert.   Psychiatric:         Mood and Affect: Mood normal.         Behavior: Behavior normal.        Result Review    Common labs    Common Labsle 9/9/21 9/9/21 9/9/21    0725 0725 0725   Glucose  85    BUN  9    Creatinine  0.72    eGFR Non African Am  98    Sodium  139    Potassium  4.1    Chloride  107    Calcium  9.6    Albumin  4.50    Total Bilirubin  0.3    Alkaline Phosphatase  47    AST (SGOT)  11    ALT (SGPT)  8    WBC 7.21     Hemoglobin 13.5     Hematocrit 40.1     Platelets 275     Total Cholesterol   143   Triglycerides   89   HDL Cholesterol   45   LDL Cholesterol    81           No Images in the past 120 days found..      The above data has been reviewed by VALERIE Matthews 08/29/2022 11:56 EDT.          Patient Care Team:  Jassi De Leon PA as PCP - General (Physician Assistant)    ASSESSMENT & PLAN    Diagnoses and all orders for this visit:    1. Fatigue, unspecified type (Primary)  -     CBC & Differential; Future  -     Comprehensive Metabolic Panel; Future  -     Urinalysis With Culture If Indicated -; Future  -     Vitamin B12; Future  -     Folate; Future  -     Iron level; Future    2. Asthma, unspecified asthma severity, unspecified whether complicated, unspecified whether persistent  -     albuterol sulfate  (90 Base) MCG/ACT inhaler; Inhale 2 puffs Every 6 (Six) Hours As Needed for Wheezing.  Dispense: 18 g; Refill: 6  -     Budeson-Glycopyrrol-Formoterol (Breztri Aerosphere) 160-9-4.8 MCG/ACT aerosol inhaler; Inhale 2 puffs 2 (Two) Times a Day.  Dispense: 10.7 g; Refill: 3  -     montelukast (SINGULAIR) 10 MG tablet; Take 1 tablet by mouth Every Evening.  Dispense: 90 tablet; Refill: 1    3. Cough with exposure to COVID-19 virus  -      Budeson-Glycopyrrol-Formoterol (Breztri Aerosphere) 160-9-4.8 MCG/ACT aerosol inhaler; Inhale 2 puffs 2 (Two) Times a Day.  Dispense: 10.7 g; Refill: 3    4. Vitamin D deficiency  -     cholecalciferol (VITAMIN D3) 1.25 MG (06106 UT) capsule; Take 1 capsule by mouth 1 (One) Time Per Week.  Dispense: 13 capsule; Refill: 3  -     Vitamin D 25 Hydroxy; Future    5. Hypothyroidism, unspecified type  -     levothyroxine (SYNTHROID, LEVOTHROID) 50 MCG tablet; Take 1 tablet by mouth Daily.  Dispense: 90 tablet; Refill: 1  -     TSH; Future  -     T4, Free; Future    6. Allergic rhinitis, unspecified seasonality, unspecified trigger  Comments:  Good control with allegra D - continue  Orders:  -     fexofenadine-pseudoephedrine (Allegra-D Allergy & Congestion) 180-240 MG per 24 hr tablet; Take 1 tablet by mouth Daily.  Dispense: 90 tablet; Refill: 1  -     montelukast (SINGULAIR) 10 MG tablet; Take 1 tablet by mouth Every Evening.  Dispense: 90 tablet; Refill: 1    7. Migraine with aura and without status migrainosus, not intractable  Comments:  Good control, we will start imitrex and low beta blocker -continue  Orders:  -     propranolol (INDERAL) 20 MG tablet; Take 1 tablet by mouth 3 (Three) Times a Day.  Dispense: 90 tablet; Refill: 5  -     Rimegepant Sulfate (Nurtec) 75 MG tablet dispersible tablet; Take 1 tablet by mouth Daily As Needed (Headache).  Dispense: 8 tablet; Refill: 5    8. Tachycardia  -     propranolol (INDERAL) 20 MG tablet; Take 1 tablet by mouth 3 (Three) Times a Day.  Dispense: 90 tablet; Refill: 5  -     TSH; Future  -     T4, Free; Future    9. Screening for lipid disorders  -     Lipid Panel; Future    10. Bipolar 1 disorder (HCC)  -     Ambulatory Referral to Psychiatry     [facility]    Tobacco Use: Medium Risk   • Smoking Tobacco Use: Former Smoker   • Smokeless Tobacco Use: Never Used     Follow Up     Return in about 6 months (around 2/28/2023).    Patient was given  instructions and counseling regarding her condition or for health maintenance advice. Please see specific information pulled into the AVS if appropriate.   I have reviewed information obtained and documented by others and I have confirmed the accuracy of this documented note.    VALERIE Matthews

## 2022-08-31 ENCOUNTER — LAB (OUTPATIENT)
Dept: LAB | Facility: HOSPITAL | Age: 27
End: 2022-08-31

## 2022-08-31 DIAGNOSIS — R89.9 ABNORMAL LABORATORY TEST: Primary | ICD-10-CM

## 2022-08-31 DIAGNOSIS — R53.83 FATIGUE, UNSPECIFIED TYPE: ICD-10-CM

## 2022-08-31 DIAGNOSIS — E55.9 VITAMIN D DEFICIENCY: ICD-10-CM

## 2022-08-31 DIAGNOSIS — R00.0 TACHYCARDIA: Chronic | ICD-10-CM

## 2022-08-31 DIAGNOSIS — Z13.220 SCREENING FOR LIPID DISORDERS: ICD-10-CM

## 2022-08-31 DIAGNOSIS — E03.9 HYPOTHYROIDISM, UNSPECIFIED TYPE: ICD-10-CM

## 2022-08-31 LAB
25(OH)D3 SERPL-MCNC: 62.1 NG/ML (ref 30–100)
ALBUMIN SERPL-MCNC: 4.8 G/DL (ref 3.5–5.2)
ALBUMIN/GLOB SERPL: 2.5 G/DL
ALP SERPL-CCNC: 48 U/L (ref 39–117)
ALT SERPL W P-5'-P-CCNC: 6 U/L (ref 1–33)
ANION GAP SERPL CALCULATED.3IONS-SCNC: 8 MMOL/L (ref 5–15)
AST SERPL-CCNC: 11 U/L (ref 1–32)
BASOPHILS # BLD AUTO: 0.02 10*3/MM3 (ref 0–0.2)
BASOPHILS NFR BLD AUTO: 0.4 % (ref 0–1.5)
BILIRUB SERPL-MCNC: 0.4 MG/DL (ref 0–1.2)
BUN SERPL-MCNC: 11 MG/DL (ref 6–20)
BUN/CREAT SERPL: 14.3 (ref 7–25)
CALCIUM SPEC-SCNC: 9.5 MG/DL (ref 8.6–10.5)
CHLORIDE SERPL-SCNC: 108 MMOL/L (ref 98–107)
CHOLEST SERPL-MCNC: 169 MG/DL (ref 0–200)
CO2 SERPL-SCNC: 23 MMOL/L (ref 22–29)
CREAT SERPL-MCNC: 0.77 MG/DL (ref 0.57–1)
DEPRECATED RDW RBC AUTO: 40.8 FL (ref 37–54)
EGFRCR SERPLBLD CKD-EPI 2021: 109.3 ML/MIN/1.73
EOSINOPHIL # BLD AUTO: 0.22 10*3/MM3 (ref 0–0.4)
EOSINOPHIL NFR BLD AUTO: 4.5 % (ref 0.3–6.2)
ERYTHROCYTE [DISTWIDTH] IN BLOOD BY AUTOMATED COUNT: 12.5 % (ref 12.3–15.4)
FOLATE SERPL-MCNC: 8.96 NG/ML (ref 4.78–24.2)
GLOBULIN UR ELPH-MCNC: 1.9 GM/DL
GLUCOSE SERPL-MCNC: 83 MG/DL (ref 65–99)
HCT VFR BLD AUTO: 39.8 % (ref 34–46.6)
HDLC SERPL-MCNC: 58 MG/DL (ref 40–60)
HGB BLD-MCNC: 13.2 G/DL (ref 12–15.9)
IMM GRANULOCYTES # BLD AUTO: 0.01 10*3/MM3 (ref 0–0.05)
IMM GRANULOCYTES NFR BLD AUTO: 0.2 % (ref 0–0.5)
IRON 24H UR-MRATE: 151 MCG/DL (ref 37–145)
LDLC SERPL CALC-MCNC: 95 MG/DL (ref 0–100)
LDLC/HDLC SERPL: 1.61 {RATIO}
LYMPHOCYTES # BLD AUTO: 1.66 10*3/MM3 (ref 0.7–3.1)
LYMPHOCYTES NFR BLD AUTO: 34.1 % (ref 19.6–45.3)
MCH RBC QN AUTO: 29.9 PG (ref 26.6–33)
MCHC RBC AUTO-ENTMCNC: 33.2 G/DL (ref 31.5–35.7)
MCV RBC AUTO: 90 FL (ref 79–97)
MONOCYTES # BLD AUTO: 0.29 10*3/MM3 (ref 0.1–0.9)
MONOCYTES NFR BLD AUTO: 6 % (ref 5–12)
NEUTROPHILS NFR BLD AUTO: 2.67 10*3/MM3 (ref 1.7–7)
NEUTROPHILS NFR BLD AUTO: 54.8 % (ref 42.7–76)
NRBC BLD AUTO-RTO: 0 /100 WBC (ref 0–0.2)
PLATELET # BLD AUTO: 259 10*3/MM3 (ref 140–450)
PMV BLD AUTO: 11.2 FL (ref 6–12)
POTASSIUM SERPL-SCNC: 4.3 MMOL/L (ref 3.5–5.2)
PROT SERPL-MCNC: 6.7 G/DL (ref 6–8.5)
RBC # BLD AUTO: 4.42 10*6/MM3 (ref 3.77–5.28)
SODIUM SERPL-SCNC: 139 MMOL/L (ref 136–145)
T4 FREE SERPL-MCNC: 1 NG/DL (ref 0.93–1.7)
TRIGL SERPL-MCNC: 89 MG/DL (ref 0–150)
TSH SERPL DL<=0.05 MIU/L-ACNC: 1.98 UIU/ML (ref 0.27–4.2)
VIT B12 BLD-MCNC: 225 PG/ML (ref 211–946)
VLDLC SERPL-MCNC: 16 MG/DL (ref 5–40)
WBC NRBC COR # BLD: 4.87 10*3/MM3 (ref 3.4–10.8)

## 2022-08-31 PROCEDURE — 83540 ASSAY OF IRON: CPT

## 2022-08-31 PROCEDURE — 36415 COLL VENOUS BLD VENIPUNCTURE: CPT

## 2022-08-31 PROCEDURE — 84439 ASSAY OF FREE THYROXINE: CPT

## 2022-08-31 PROCEDURE — 80050 GENERAL HEALTH PANEL: CPT

## 2022-08-31 PROCEDURE — 82607 VITAMIN B-12: CPT

## 2022-08-31 PROCEDURE — 82728 ASSAY OF FERRITIN: CPT

## 2022-08-31 PROCEDURE — 82746 ASSAY OF FOLIC ACID SERUM: CPT

## 2022-08-31 PROCEDURE — 82306 VITAMIN D 25 HYDROXY: CPT

## 2022-08-31 PROCEDURE — 80061 LIPID PANEL: CPT

## 2022-08-31 PROCEDURE — 84466 ASSAY OF TRANSFERRIN: CPT

## 2022-09-01 DIAGNOSIS — E53.8 B12 DEFICIENCY: ICD-10-CM

## 2022-09-01 DIAGNOSIS — R53.83 FATIGUE, UNSPECIFIED TYPE: Primary | ICD-10-CM

## 2022-09-01 RX ORDER — CYANOCOBALAMIN 1000 UG/ML
INJECTION, SOLUTION INTRAMUSCULAR; SUBCUTANEOUS
Qty: 1 ML | Refills: 12 | Status: SHIPPED | OUTPATIENT
Start: 2022-09-01

## 2022-09-01 RX ORDER — CYANOCOBALAMIN 1000 UG/ML
1000 INJECTION, SOLUTION INTRAMUSCULAR; SUBCUTANEOUS ONCE
COMMUNITY
End: 2022-09-01 | Stop reason: SDUPTHER

## 2022-09-02 LAB
FERRITIN SERPL-MCNC: 70.3 NG/ML (ref 13–150)
IRON 24H UR-MRATE: 156 MCG/DL (ref 37–145)
IRON SATN MFR SERPL: 40 % (ref 20–50)
TIBC SERPL-MCNC: 395 MCG/DL (ref 298–536)
TRANSFERRIN SERPL-MCNC: 265 MG/DL (ref 200–360)

## 2022-09-07 ENCOUNTER — LAB (OUTPATIENT)
Dept: LAB | Facility: HOSPITAL | Age: 27
End: 2022-09-07

## 2022-09-07 DIAGNOSIS — R53.83 FATIGUE, UNSPECIFIED TYPE: ICD-10-CM

## 2022-09-07 DIAGNOSIS — R89.9 ABNORMAL LABORATORY TEST: ICD-10-CM

## 2022-09-07 PROCEDURE — 81256 HFE GENE: CPT

## 2022-09-07 PROCEDURE — 36415 COLL VENOUS BLD VENIPUNCTURE: CPT

## 2022-09-12 LAB — HFE GENE MUT ANL BLD/T: NORMAL

## 2022-11-09 ENCOUNTER — TELEPHONE (OUTPATIENT)
Dept: OBSTETRICS AND GYNECOLOGY | Facility: CLINIC | Age: 27
End: 2022-11-09

## 2022-11-22 ENCOUNTER — TELEPHONE (OUTPATIENT)
Dept: FAMILY MEDICINE CLINIC | Facility: CLINIC | Age: 27
End: 2022-11-22

## 2022-11-22 DIAGNOSIS — Z30.9 ENCOUNTER FOR CONTRACEPTIVE MANAGEMENT, UNSPECIFIED TYPE: ICD-10-CM

## 2022-11-22 RX ORDER — MEDROXYPROGESTERONE ACETATE 150 MG/ML
150 INJECTION, SUSPENSION INTRAMUSCULAR
Qty: 1 ML | Refills: 3 | Status: SHIPPED | OUTPATIENT
Start: 2022-11-22 | End: 2023-01-28 | Stop reason: SDUPTHER

## 2022-11-22 NOTE — TELEPHONE ENCOUNTER
Jennifer talked with the patient and advised her that her lab work is due. She will get this completed.

## 2022-11-28 ENCOUNTER — LAB (OUTPATIENT)
Dept: LAB | Facility: HOSPITAL | Age: 27
End: 2022-11-28

## 2022-11-28 DIAGNOSIS — R53.83 FATIGUE, UNSPECIFIED TYPE: ICD-10-CM

## 2022-11-28 DIAGNOSIS — F31.30 BIPOLAR I DISORDER, MOST RECENT EPISODE DEPRESSED: ICD-10-CM

## 2022-11-28 LAB
BACTERIA UR QL AUTO: NORMAL /HPF
BILIRUB UR QL STRIP: NEGATIVE
CLARITY UR: CLEAR
COLOR UR: YELLOW
GLUCOSE UR STRIP-MCNC: NEGATIVE MG/DL
HGB UR QL STRIP.AUTO: NEGATIVE
HYALINE CASTS UR QL AUTO: NORMAL /LPF
IRON 24H UR-MRATE: 45 MCG/DL (ref 37–145)
KETONES UR QL STRIP: NEGATIVE
LEUKOCYTE ESTERASE UR QL STRIP.AUTO: ABNORMAL
NITRITE UR QL STRIP: NEGATIVE
PH UR STRIP.AUTO: 7.5 [PH] (ref 5–8)
PROT UR QL STRIP: NEGATIVE
RBC # UR STRIP: NORMAL /HPF
REF LAB TEST METHOD: NORMAL
SP GR UR STRIP: 1.01 (ref 1–1.03)
SQUAMOUS #/AREA URNS HPF: NORMAL /HPF
UROBILINOGEN UR QL STRIP: ABNORMAL
WBC # UR STRIP: NORMAL /HPF

## 2022-11-28 PROCEDURE — 83540 ASSAY OF IRON: CPT

## 2022-11-28 PROCEDURE — 81001 URINALYSIS AUTO W/SCOPE: CPT

## 2022-11-28 PROCEDURE — 36415 COLL VENOUS BLD VENIPUNCTURE: CPT

## 2022-11-28 NOTE — TELEPHONE ENCOUNTER
Med refill, pt has not been seen since 08/2022, pt cancel up coming appt, [facility] to schedule f/u appt

## 2022-11-29 DIAGNOSIS — F31.30 BIPOLAR I DISORDER, MOST RECENT EPISODE DEPRESSED: ICD-10-CM

## 2022-11-29 NOTE — TELEPHONE ENCOUNTER
ATTEMPTED TO CONTACT PT      NO ANSWER      LEFT VOICEMAIL WITH INSTRUCTIONS TO RETURN CALL TO OFFICE AT [phone]

## 2022-11-30 RX ORDER — QUETIAPINE FUMARATE 25 MG/1
TABLET, FILM COATED ORAL
Qty: 30 TABLET | Refills: 2 | Status: SHIPPED | OUTPATIENT
Start: 2022-11-30 | End: 2023-01-03

## 2022-11-30 RX ORDER — DESVENLAFAXINE SUCCINATE 50 MG/1
TABLET, EXTENDED RELEASE ORAL
Qty: 30 TABLET | Refills: 2 | Status: SHIPPED | OUTPATIENT
Start: 2022-11-30 | End: 2023-01-04 | Stop reason: SDUPTHER

## 2022-11-30 NOTE — TELEPHONE ENCOUNTER
PT(PATIENT) VERIFIED     CONTACTED PT(PATIENT) TO SCHEDULE APPT  Appointment with Te Beard APRN (2023)    PROVIDER PLEASE ADVISE

## 2023-01-03 ENCOUNTER — TELEMEDICINE (OUTPATIENT)
Dept: PSYCHIATRY | Facility: CLINIC | Age: 28
End: 2023-01-03
Payer: COMMERCIAL

## 2023-01-03 DIAGNOSIS — F31.30 BIPOLAR I DISORDER, MOST RECENT EPISODE DEPRESSED: Primary | ICD-10-CM

## 2023-01-03 DIAGNOSIS — F99 INSOMNIA DUE TO OTHER MENTAL DISORDER: ICD-10-CM

## 2023-01-03 DIAGNOSIS — F41.1 GENERALIZED ANXIETY DISORDER: ICD-10-CM

## 2023-01-03 DIAGNOSIS — F51.05 INSOMNIA DUE TO OTHER MENTAL DISORDER: ICD-10-CM

## 2023-01-03 PROCEDURE — 99214 OFFICE O/P EST MOD 30 MIN: CPT | Performed by: NURSE PRACTITIONER

## 2023-01-03 PROCEDURE — 90833 PSYTX W PT W E/M 30 MIN: CPT | Performed by: NURSE PRACTITIONER

## 2023-01-03 NOTE — PROGRESS NOTES
Subjective   Maurice Lazo is a 27 y.o. female who presents today for follow up. Mode of visit: Video  Location of provider: [address], [place] [address].  Location of patient: Home  Does the patient consent to use a video/audio connection for your medical care today? Yes  The visit included audio and video interaction. No technical issues occurred during this visit.    Chief Complaint:  Bipolar Disorder, anxiety, PTSD    History of Present Illness:   Depression over the past month- \"moods all over the place\"  Depressed mood: y- at times  Markedly diminished interest or pleasure: y- at times  Significant weight loss when not dieting or weight gain, or decrease or increase in appetite: n  Insomnia or hypersomnia: n- \"maybe too much\"  Psychomotor agitation or retardation: n  Fatigue or loss of energy: y  Feelings of worthlessness or excessive or inappropriate guilt: y  Diminished ability to think or concentrate, or indecisiveness: n  Recurrent thoughts of death, recurrent suicidal ideation without a specific plan, or suicide attempt or specific plan for committing suicide- denies    Anxiety over the past month- has been high   Anxious, nervous or worried on most days about a number of events or activities- excessive worries  No control over worries- difficult to manage at times- working on positive coping skills  Irritability- denies  Fatigue: see above  Difficulty concentrating- see above  Sleep disturbance- see above  Restlessness- denies  Tension in muscles- endorses    Samantha/hypomania over the past month- increased mood swings  Grandiosity- denies  Decreased need for sleep- denies  More talkative than usual or pressure to keep talking- denies  Flight of ideas or subjective experience that thoughts are racing- denies  Distractibility- denies  Increase in goal directed activity or psychomotor agitation- denies  Excessive involvement in activities that have a high potential for painful  consequences- denies    Access to Firearms: Denies    Past Surgical History:  Past Surgical History:   Procedure Laterality Date   • DILATATION AND CURETTAGE     • OTHER SURGICAL HISTORY      Surgical Clips   • ROTATOR CUFF REPAIR     • SHOULDER SURGERY     • WISDOM TOOTH EXTRACTION         Problem List:  Patient Active Problem List   Diagnosis   • Facial aging   • Anxiety   • Asthma   • Bipolar 1 disorder (HCC)   • Depression   • Hashimoto's thyroiditis   • Migraine headache   • Nicotine dependence   • Predisposition to allergic reaction   • Hypothyroidism   • Thyroid disorder   • Facial aging   • Well woman exam with routine gynecological exam       Allergy:   Allergies   Allergen Reactions   • Meperidine Hives and Nausea And Vomiting   • Lamictal [Lamotrigine] Rash   • Tegaderm Ag Mesh [Silver] Rash        Discontinued Medications:  Medications Discontinued During This Encounter   Medication Reason   • cyclobenzaprine (FLEXERIL) 5 MG tablet *Therapy completed   • fexofenadine-pseudoephedrine (Allegra-D Allergy & Congestion) 180-240 MG per 24 hr tablet *Therapy completed   • naproxen (NAPROSYN) 500 MG tablet *Therapy completed   • QUEtiapine (SEROquel) 25 MG tablet *Therapy completed       Current Medications:   Current Outpatient Medications   Medication Sig Dispense Refill   • albuterol sulfate  (90 Base) MCG/ACT inhaler Inhale 2 puffs Every 6 (Six) Hours As Needed for Wheezing. 18 g 6   • Budeson-Glycopyrrol-Formoterol (Breztri Aerosphere) 160-9-4.8 MCG/ACT aerosol inhaler Inhale 2 puffs 2 (Two) Times a Day. 10.7 g 3   • cholecalciferol (VITAMIN D3) 1.25 MG (71734 UT) capsule Take 1 capsule by mouth 1 (One) Time Per Week. 13 capsule 3   • cyanocobalamin 1000 MCG/ML injection 1xweek for 4 weeks then monthly 1 mL 12   • desvenlafaxine (PRISTIQ) 50 MG 24 hr tablet TAKE ONE TABLET BY MOUTH DAILY 30 tablet 2   • fluticasone (FLONASE) 50 MCG/ACT nasal spray 1 spray into the nostril(s) as directed by provider 2  (Two) Times a Day for 30 days. (Patient taking differently: 1 spray into the nostril(s) as directed by provider 2 (Two) Times a Day As Needed.) 16 g 0   • levothyroxine (SYNTHROID, LEVOTHROID) 50 MCG tablet Take 1 tablet by mouth Daily. 90 tablet 1   • medroxyPROGESTERone (Depo-Provera) 150 MG/ML injection Inject 1 mL into the appropriate muscle as directed by prescriber Every 3 (Three) Months. 1 mL 3   • montelukast (SINGULAIR) 10 MG tablet Take 1 tablet by mouth Every Evening. 90 tablet 1   • propranolol (INDERAL) 20 MG tablet Take 1 tablet by mouth 3 (Three) Times a Day. 90 tablet 5   • Rimegepant Sulfate (Nurtec) 75 MG tablet dispersible tablet Take 1 tablet by mouth Daily As Needed (Headache). 8 tablet 5   • Cariprazine HCl (Vraylar) 1.5 MG capsule capsule Take 1 capsule by mouth Daily for 30 days. 30 capsule 0     No current facility-administered medications for this visit.       Past Medical History:  Past Medical History:   Diagnosis Date   • Allergy    • Anxiety    • Asthma    • Bipolar 1 disorder (HCC)    • Depression    • Facial aging    • Gastroesophageal reflux disease    • Migraine headache    • Nicotine dependence 04/08/2021   • Thyroid disorder        Past Psychiatric History:  Medication Trials: Abilify, Rexulti, lamictal, quetiapine, Vraylar for mood, with no benefits.  Prozac, Zoloft and Trintellix for depression and anxiety with little benefit.    Substance Abuse History:   Types: Denies  Withdrawal Symptoms: Denies  Longest Period Sober: Denies  AA: Denies      Social History     Socioeconomic History   • Marital status: Single   Tobacco Use   • Smoking status: Former     Packs/day: 0.50     Years: 2.00     Pack years: 1.00     Types: Cigarettes     Quit date: 2020     Years since quitting: 3.0   • Smokeless tobacco: Never   • Tobacco comments:     former, 1 packs per day, smoked for 6-10 years   Vaping Use   • Vaping Use: Never used   Substance and Sexual Activity   • Alcohol use: Yes      Comment: Current some day  rarely drinks   • Drug use: Never   • Sexual activity: Yes     Birth control/protection: Injection         Family History   Problem Relation Age of Onset   • Stroke Mother    • Lung cancer Maternal Grandmother    • Heart disease Other    • Anxiety disorder Father    • Bipolar disorder Paternal Aunt    • Schizophrenia Paternal Aunt        Mental Status Exam:     Appearance: good eye contact, normal street clothes, groomed, sitting in chair   Behavior: pleasant and cooperative  Motor: no abnormal  Speech: normal rhythm, rate, volume, tone, not hyperverbal, not pressured, normal prosidy  Mood: \"Okay\"  Affect: euthymic  Thought Content: negative suicidal ideations, negative homicidal ideations, negative obsessions  Perceptions: negative auditory hallucinations, negative visual hallucinations, negative delusions, negative paranoia  Thought Process: goal directed, linear  Insight/Judgement: fair/fair  Cognition: grossly intact  Attention: intact  Orientation: person, place, time and situation  Memory: intact    Review of Systems:     Constitutional: Denies fatigue, night sweats  Eyes: Denies double vision, blurred vision  HENT: Denies vertigo, recent head injury  Cardiovascular: Denies chest pain, irregular heartbeats  Respiratory: Denies productive cough, shortness of breath  Gastrointestinal: Denies nausea, vomiting  Genitourinary: Denies dysuria, urinary retention  Integument: Denies hair growth change, new skin lesions  Neurologic: Denies altered mental status, seizures  Musculoskeletal: Denies joint swelling, limitation of motion  Endocrine: Denies cold intolerance, heat intolerance  Psychiatric: Admits anxiety, depression.  Denies psychosis, kiersten, post-traumatic stress disorder, obsessive compulsive disorder.   Allergic-immunologic: Denies frequent illnesses      Vital Signs:   There were no vitals taken for this visit.     Lab Results:   Lab on 11/28/2022   Component Date Value Ref  Range Status   • Iron 11/28/2022 45  37 - 145 mcg/dL Final   Lab on 09/07/2022   Component Date Value Ref Range Status   • Color, UA 11/28/2022 Yellow  Yellow, Straw Final   • Appearance, UA 11/28/2022 Clear  Clear Final   • pH, UA 11/28/2022 7.5  5.0 - 8.0 Final   • Specific Gravity, UA 11/28/2022 1.009  1.005 - 1.030 Final   • Glucose, UA 11/28/2022 Negative  Negative Final   • Ketones, UA 11/28/2022 Negative  Negative Final   • Bilirubin, UA 11/28/2022 Negative  Negative Final   • Blood, UA 11/28/2022 Negative  Negative Final   • Protein, UA 11/28/2022 Negative  Negative Final   • Leuk Esterase, UA 11/28/2022 Trace (A)  Negative Final   • Nitrite, UA 11/28/2022 Negative  Negative Final   • Urobilinogen, UA 11/28/2022 0.2 E.U./dL  0.2 - 1.0 E.U./dL Final   • Hemochromatosis Gene 09/07/2022 Comment   Final    Comment: Result:  c.845G>A (p.Krx157Bgj) - Not Detected  c.187C>G (p.Jjr38Hfr) - Not Detected  c.193A>T (p.Ixp85Sgy) - Not Detected  Not associated with increased risk to develop clinical symptoms  of Hereditary Hemochromatosis. In symptomatic individuals, other  causes of iron overload should be evaluated. See Additional  Information and Comments.  Additional Clinical Information:  Hereditary hemochromatosis (HFE related) is an autosomal recessive  iron storage disorder. Patients may have a genetic diagnosis of  hereditary hemochromatosis and never show clinical symptoms. Clinical  symptoms typically appear between 40 to 60 years in males and after  menopause in females. Signs and symptoms may include organ damage,  primarily in the liver, risk for hepatocellular carcinoma, diabetes,  and heart disease due to iron accumulation. Life expectancy may be  decreased in individuals who develop cirrhosis. Treatment for  clinically symptomatic individuals may include therapeutic  phlebotomy. Liver                            transplant may be used to treat end stage liver  failure. For preventive care, monitoring for  iron overload is  recommended for patients who are homozygous for c.845G>A  (p.Ypp119Bdp) and have yet to experience clinical symptoms.  Comments:  The most common HFE variants associated with hereditary  hemochromatosis are c.845G>A (p.Oov675Hhz), c.187C>G (p.Blw68Oqc),  c.193A>T (p.Kup35Rkp). While patients homozygous for c.845G>A  (p.Ftn358Byd) are the most likely to present clinical symptoms, less  than 10% develop clinically significant iron overload with tissue  and organ damage.  Genetic counseling is recommended to discuss the potential clinical  implications of positive results, as well as recommendations for  testing family members.  Genetic Coordinators are available for health care providers to  discuss results at [phone]  Test Details:  Three variants analyzed:  c.845G>A (p.Ggk439Pdj), commonly referred to as C282Y  c.187C>G (p.Dqw91Szu), commonly referred to as                            H63D  c.193A>T (p.Scf01Wlh), commonly referred to as S65C  Methods/Limitations:  DNA Analysis of the HFE gene (NM_[id number]) was performed by PCR  amplification followed by restriction enzyme digestion analyses.  Results must be combined with clinical information for the most  accurate interpretation. Molecular-based testing is highly accurate,  but as in any laboratory test, diagnostic errors may occur. False  positive or false negative results may occur for reasons that  include genetic variants, blood transfusions, bone marrow  transplantation, somatic or tissue-specific mosaicism, mislabeled  samples, or erroneous representation of family relationships.  This test was developed and its performance characteristics  determined by [facility]. It has not been cleared or approved by the  Food and Drug Administration.  References:  Skyler BR, Vishal PC, Leann KV, Vasu LW, Isabela AS; American  Association for the Study of Liver Diseases. Diagnosis and management  of hemochromatosis: 2011                             practice guideline by the American  Association for the Study of Liver Diseases. Hepatology. 2011  Jul;54(1):328-43. doi: 10.1002/hep.77300. PMID: 80450181; PMCID:  XAJ8231330.  Marlen G, Jalen P, Jessika ALAS, Robel H, Jarret O, Meño S,  Car I, Beau M, Jacquelyn S. [facility] best practice guidelines for the  molecular genetic diagnosis of hereditary hemochromatosis (HH). Eur  J Hum Princess. 2016 Apr;24(4):479-95. doi: 10.1038/ejhg.2015.128.  Epub 2015 Jul 8. PMID: 65856997; PMCID: [id number].  Kateryna Christopher, PhD, [facility]  Sadiq Guajardo, PhD  Deyvi Eli, PhD, [facility]  Federico Mo, PhD, [facility]  Donn Ford, PhD, [facility]  SIMA Arzate, PhD, [facility]  Socorro Veras, PhD, [facility]  Charmaine Trujillo, PhD, [facility]   • RBC, UA 11/28/2022 0-2  None Seen, 0-2 /HPF Final   • WBC, UA 11/28/2022 0-2  None Seen, 0-2 /HPF Final   • Bacteria, UA 11/28/2022 None Seen  None Seen /HPF Final   • Squamous Epithelial Cells, UA 11/28/2022 0-2  None Seen, 0-2 /HPF Final   • Hyaline Casts, UA 11/28/2022 None Seen  None Seen /LPF Final   • Methodology 11/28/2022 Automated Microscopy   Final   Lab on 08/31/2022   Component Date Value Ref Range Status   • Total Cholesterol 08/31/2022 169  0 - 200 mg/dL Final   • Triglycerides 08/31/2022 89  0 - 150 mg/dL Final   • HDL Cholesterol 08/31/2022 58  40 - 60 mg/dL Final   • LDL Cholesterol  08/31/2022 95  0 - 100 mg/dL Final   • VLDL Cholesterol 08/31/2022 16  5 - 40 mg/dL Final   • LDL/HDL Ratio 08/31/2022 1.61   Final   • Glucose 08/31/2022 83  65 - 99 mg/dL Final   • BUN 08/31/2022 11  6 - 20 mg/dL Final   • Creatinine 08/31/2022 0.77  0.57 - 1.00 mg/dL Final   • Sodium 08/31/2022 139  136 - 145 mmol/L Final   • Potassium 08/31/2022 4.3  3.5 - 5.2 mmol/L Final   • Chloride 08/31/2022 108 (H)  98 - 107 mmol/L Final   • CO2 08/31/2022 23.0  22.0 - 29.0 mmol/L Final   • Calcium 08/31/2022 9.5  8.6 - 10.5 mg/dL Final   • Total Protein 08/31/2022 6.7  6.0 - 8.5 g/dL Final   • Albumin 08/31/2022  4.80  3.50 - 5.20 g/dL Final   • ALT (SGPT) 08/31/2022 6  1 - 33 U/L Final   • AST (SGOT) 08/31/2022 11  1 - 32 U/L Final   • Alkaline Phosphatase 08/31/2022 48  39 - 117 U/L Final   • Total Bilirubin 08/31/2022 0.4  0.0 - 1.2 mg/dL Final   • Globulin 08/31/2022 1.9  gm/dL Final   • A/G Ratio 08/31/2022 2.5  g/dL Final   • BUN/Creatinine Ratio 08/31/2022 14.3  7.0 - 25.0 Final   • Anion Gap 08/31/2022 8.0  5.0 - 15.0 mmol/L Final   • eGFR 08/31/2022 109.3  >60.0 mL/min/1.73 Final    National Kidney Foundation and American Society of Nephrology (ASN) Task Force recommended calculation based on the Chronic Kidney Disease Epidemiology Collaboration (CKD-EPI) equation refit without adjustment for race.   • TSH 08/31/2022 1.980  0.270 - 4.200 uIU/mL Final   • Free T4 08/31/2022 1.00  0.93 - 1.70 ng/dL Final   • 25 Hydroxy, Vitamin D 08/31/2022 62.1  30.0 - 100.0 ng/ml Final   • Vitamin B-12 08/31/2022 225  211 - 946 pg/mL Final   • Folate 08/31/2022 8.96  4.78 - 24.20 ng/mL Final   • Iron 08/31/2022 151 (H)  37 - 145 mcg/dL Final   • WBC 08/31/2022 4.87  3.40 - 10.80 10*3/mm3 Final   • RBC 08/31/2022 4.42  3.77 - 5.28 10*6/mm3 Final   • Hemoglobin 08/31/2022 13.2  12.0 - 15.9 g/dL Final   • Hematocrit 08/31/2022 39.8  34.0 - 46.6 % Final   • MCV 08/31/2022 90.0  79.0 - 97.0 fL Final   • MCH 08/31/2022 29.9  26.6 - 33.0 pg Final   • MCHC 08/31/2022 33.2  31.5 - 35.7 g/dL Final   • RDW 08/31/2022 12.5  12.3 - 15.4 % Final   • RDW-SD 08/31/2022 40.8  37.0 - 54.0 fl Final   • MPV 08/31/2022 11.2  6.0 - 12.0 fL Final   • Platelets 08/31/2022 259  140 - 450 10*3/mm3 Final   • Neutrophil % 08/31/2022 54.8  42.7 - 76.0 % Final   • Lymphocyte % 08/31/2022 34.1  19.6 - 45.3 % Final   • Monocyte % 08/31/2022 6.0  5.0 - 12.0 % Final   • Eosinophil % 08/31/2022 4.5  0.3 - 6.2 % Final   • Basophil % 08/31/2022 0.4  0.0 - 1.5 % Final   • Immature Grans % 08/31/2022 0.2  0.0 - 0.5 % Final   • Neutrophils, Absolute 08/31/2022 2.67  1.70  - 7.00 10*3/mm3 Final   • Lymphocytes, Absolute 08/31/2022 1.66  0.70 - 3.10 10*3/mm3 Final   • Monocytes, Absolute 08/31/2022 0.29  0.10 - 0.90 10*3/mm3 Final   • Eosinophils, Absolute 08/31/2022 0.22  0.00 - 0.40 10*3/mm3 Final   • Basophils, Absolute 08/31/2022 0.02  0.00 - 0.20 10*3/mm3 Final   • Immature Grans, Absolute 08/31/2022 0.01  0.00 - 0.05 10*3/mm3 Final   • nRBC 08/31/2022 0.0  0.0 - 0.2 /100 WBC Final   • Iron 08/31/2022 156 (H)  37 - 145 mcg/dL Final   • Iron Saturation 08/31/2022 40  20 - 50 % Final   • Transferrin 08/31/2022 265  200 - 360 mg/dL Final   • TIBC 08/31/2022 395  298 - 536 mcg/dL Final   • Ferritin 08/31/2022 70.30  13.00 - 150.00 ng/mL Final   Ancillary Procedure on 03/17/2022   Component Date Value Ref Range Status   • Target HR (85%) 03/17/2022 165  bpm Final   • Max. Pred. HR (100%) 03/17/2022 194  bpm Final   Lab on 03/02/2022   Component Date Value Ref Range Status   • Prolactin 03/02/2022 18.80  4.79 - 23.30 ng/mL Final   • FSH 03/02/2022 5.58  mIU/mL Final   • LH 03/02/2022 10.50  mIU/mL Final   Lab on 01/11/2022   Component Date Value Ref Range Status   • COVID19 01/11/2022 Not Detected  Not Detected - Ref. Range Final       EKG Results:  No orders to display       Imaging Results:  No Images in the past 120 days found..      Assessment & Plan   Diagnoses and all orders for this visit:    1. Bipolar I disorder, most recent episode depressed (HCC) (Primary)  -     Cariprazine HCl (Vraylar) 1.5 MG capsule capsule; Take 1 capsule by mouth Daily for 30 days.  Dispense: 30 capsule; Refill: 0    2. Generalized anxiety disorder    3. Insomnia due to other mental disorder       Continue desvenlafaxine to target depression and anxiety. Stop quetiapine as too sedating. Start vraylar to target mood. 16 minutes of supportive psychotherapy with goal to strengthen defenses, promote problems solving, restore adaptive functioning and provide symptom relief. The therapeutic alliance was  strengthened to encourage the patient to express their thoughts and feelings. Esteem building was enhanced through praise, reassurance, normalizing and encouragement. Coping skills were enhanced to build distress tolerance skills and emotional regulation. Allowed patient to freely discuss issues without interruption or judgement with unconditional positive regard, active listening skills, and empathy. Provided a safe, confidential environment to facilitate the development of a positive therapeutic relationship and encourage open, honest communication. Assisted patient in identifying risk factors which would indicate the need for higher level of care including thoughts to harm self or others and/or self-harming behavior and encouraged patient to contact this office, call 911, or present to the nearest emergency room should any of these events occur. Assisted patient in processing session content; acknowledged and normalized patient's thoughts, feelings, and concerns by utilizing a person-centered approach in efforts to build appropriate rapport and a positive therapeutic relationship with open and honest communication. Patient given education on medication side effects, diagnosis/illness and relapse symptoms. Plan to continue supportive psychotherapy in next appointment to provide symptom relief. 4 weeks    Diagnoses: as above  Symptoms: as above  Functional status: good  Mental Status Exam: as above     Treatment plan: medication management and supportive psychotherapy  Prognosis: good  Progress: continued improvement    Visit Diagnoses:    ICD-10-CM ICD-9-CM   1. Bipolar I disorder, most recent episode depressed (HCC)  F31.30 296.50   2. Generalized anxiety disorder  F41.1 300.02   3. Insomnia due to other mental disorder  F51.05 300.9    F99 327.02       PLAN:  1. Safety: No acute safety concerns.   2. Therapy: Continue [facility]  3. Risk Assessment: Risk of self-harm acutely  is moderate.  Risk factors include anxiety disorder, mood disorder, history of self harm and recent psychosocial stressors (pandemic). Protective factors include no family history, denies access to guns/weapons, no present SI, no history of suicide attempts, minimal AODA, healthcare seeking, future orientation, willingness to engage in care.  Risk of self-harm chronically is also moderate, but could be further elevated in the event of treatment noncompliance and/or AODA.  4. Medications: Continue desvenlafaxine 50 mg p.o. daily to target depression and anxiety.  Discussed all risks, benefits, alternatives, and side effects of Pristiq including but not limited to GI symptoms (N/V/D, constipation), sexual dysfunction, dizziness, insomnia, hyperhidrosis, anxiety, bleeding risk, seizure risk, CNS depression, dyslipidemia, activation of kiersten or hypomania, increased fragility fracture risk, hyponatremia, ocular effects, HTN, withdrawal syndrome following abrupt discontinuation, serotonin syndrome, and activation of suicidal ideation and behavior.  Discussed the need for pt to immediately call the office for any new or worsening symptoms, such as worsening depression; feeling nervous or restless; suicidal thoughts or actions; or other changes changes in mood or behavior, and all other concerns. Pt educated on med compliance and the risks of suddenly stopping this medication or missing doses. Pt verbalized understanding and is agreeable to taking Pristiq. Addressed all questions and concerns. Stop quetiapine. Start vraylar 1.5mg po qday to target mood. Risks, benefits, alternatives discussed with patient including nausea and vomiting, GI upset, sedation, akathisia, theoretical risk of tardive dyskinesia, and weight gain. After discussion of these risks and benefits, the patient voiced understanding and agreed to proceed.  5. Labs/studies: No labs/studies ordered at this time  6. Follow-up: 1 month      TREATMENT  PLAN/GOALS: Continue supportive psychotherapy efforts and medications as indicated. Treatment and medication options discussed during today's visit. Patient ackowledged and verbally consented to continue with current treatment plan and was educated on the importance of compliance with treatment and follow-up appointments.    MEDICATION ISSUES:  RODNEY reviewed as expected.  Discussed medication options and treatment plan of prescribed medication as well as the risks, benefits, and side effects including potential falls, possible impaired driving and metabolic adversities among others. Patient is agreeable to call the office with any worsening of symptoms or onset of side effects. Patient is agreeable to call 911 or go to the nearest ER should he/she begin having SI/HI. No medication side effects or related complaints today.     MEDS ORDERED DURING VISIT:  New Medications Ordered This Visit   Medications   • Cariprazine HCl (Vraylar) 1.5 MG capsule capsule     Sig: Take 1 capsule by mouth Daily for 30 days.     Dispense:  30 capsule     Refill:  0       1 month follow up       This document has been electronically signed by LACHELLE Conde  January 3, 2023 13:24 EST      Part of this note may be an electronic transcription/translation of spoken language to printed text using the Dragon Dictation System.

## 2023-01-04 DIAGNOSIS — F31.30 BIPOLAR I DISORDER, MOST RECENT EPISODE DEPRESSED: ICD-10-CM

## 2023-01-04 RX ORDER — DESVENLAFAXINE SUCCINATE 50 MG/1
50 TABLET, EXTENDED RELEASE ORAL DAILY
Qty: 30 TABLET | Refills: 2 | Status: SHIPPED | OUTPATIENT
Start: 2023-01-04

## 2023-01-28 DIAGNOSIS — Z30.9 ENCOUNTER FOR CONTRACEPTIVE MANAGEMENT, UNSPECIFIED TYPE: ICD-10-CM

## 2023-01-30 DIAGNOSIS — F31.30 BIPOLAR I DISORDER, MOST RECENT EPISODE DEPRESSED: ICD-10-CM

## 2023-01-30 RX ORDER — CARIPRAZINE 1.5 MG/1
CAPSULE, GELATIN COATED ORAL
Qty: 30 CAPSULE | Refills: 0 | Status: SHIPPED | OUTPATIENT
Start: 2023-01-30 | End: 2023-02-27

## 2023-01-30 RX ORDER — MEDROXYPROGESTERONE ACETATE 150 MG/ML
150 INJECTION, SUSPENSION INTRAMUSCULAR
Qty: 1 ML | Refills: 3 | Status: SHIPPED | OUTPATIENT
Start: 2023-01-30

## 2023-01-30 NOTE — TELEPHONE ENCOUNTER
NO PROTOCOLS     NEXT APPT WITH PROVIDER  Appointment with Te Beard APRN (02/06/2023)    LAST MED REFILL  Cariprazine HCl (Vraylar) 1.5 MG capsule capsule (01/03/2023)    PROVIDER PLEASE ADVISE

## 2023-02-16 RX ORDER — DESVENLAFAXINE SUCCINATE 50 MG/1
50 TABLET, EXTENDED RELEASE ORAL DAILY
Qty: 30 TABLET | Refills: 2 | OUTPATIENT
Start: 2023-02-16 | End: 2023-05-17

## 2023-02-16 RX ORDER — QUETIAPINE FUMARATE 25 MG/1
25 TABLET, FILM COATED ORAL NIGHTLY
Qty: 30 TABLET | Refills: 2 | OUTPATIENT
Start: 2023-02-16 | End: 2023-05-17

## 2023-02-26 DIAGNOSIS — F31.30 BIPOLAR I DISORDER, MOST RECENT EPISODE DEPRESSED: ICD-10-CM

## 2023-02-27 RX ORDER — CARIPRAZINE 1.5 MG/1
CAPSULE, GELATIN COATED ORAL
Qty: 30 CAPSULE | Refills: 0 | Status: SHIPPED | OUTPATIENT
Start: 2023-02-27 | End: 2023-03-29

## 2023-02-27 NOTE — TELEPHONE ENCOUNTER
NEXT APPT WITH PROVIDER  Appointment with Te Beard APRN (03/10/2023)    LAST MED REFILL  Vraylar 1.5 MG capsule capsule (01/30/2023)  QTY 30 WITH 0 REFILLS     PROVIDER PLEASE ADVISE

## 2023-03-14 ENCOUNTER — TELEPHONE (OUTPATIENT)
Dept: FAMILY MEDICINE CLINIC | Facility: CLINIC | Age: 28
End: 2023-03-14
Payer: COMMERCIAL

## 2023-04-04 ENCOUNTER — TELEMEDICINE (OUTPATIENT)
Dept: PSYCHIATRY | Facility: CLINIC | Age: 28
End: 2023-04-04
Payer: COMMERCIAL

## 2023-04-04 DIAGNOSIS — F51.05 INSOMNIA DUE TO OTHER MENTAL DISORDER: ICD-10-CM

## 2023-04-04 DIAGNOSIS — F99 INSOMNIA DUE TO OTHER MENTAL DISORDER: ICD-10-CM

## 2023-04-04 DIAGNOSIS — F43.10 POST TRAUMATIC STRESS DISORDER (PTSD): ICD-10-CM

## 2023-04-04 DIAGNOSIS — F33.3 MAJOR DEPRESSIVE DISORDER, RECURRENT EPISODE, SEVERE, WITH PSYCHOSIS: ICD-10-CM

## 2023-04-04 DIAGNOSIS — F41.1 GENERALIZED ANXIETY DISORDER: Primary | ICD-10-CM

## 2023-04-04 PROCEDURE — 99214 OFFICE O/P EST MOD 30 MIN: CPT | Performed by: NURSE PRACTITIONER

## 2023-04-04 PROCEDURE — 90833 PSYTX W PT W E/M 30 MIN: CPT | Performed by: NURSE PRACTITIONER

## 2023-04-04 NOTE — PROGRESS NOTES
Subjective   Maurice Lazo is a 27 y.o. female who presents today for follow up. Mode of visit: Video  Location of provider: [address], [place] [address].  Location of patient: Home  Does the patient consent to use a video/audio connection for your medical care today? Yes  The visit included audio and video interaction. No technical issues occurred during this visit.    Chief Complaint:  Bipolar Disorder, anxiety, PTSD    History of Present Illness:     1. Depression/mood: has been high at times  a. Work going good  2. Anxiety: has improved  a. Managing well  b. Working on positive coping skills  3. PTSD: has improved  4. Sleep disturbance: denies  5. Low energy: denies  6. Low motivation/Interest: denies  7. Appetite change: denies  8. Medication compliant  9. Side effects: denies  10. Refills needed  11. Denies SI HI AVH    Recent neuropsychological testing done. Will send in records for review.     Access to Firearms: Denies    Past Surgical History:  Past Surgical History:   Procedure Laterality Date   • DILATATION AND CURETTAGE     • OTHER SURGICAL HISTORY      Surgical Clips   • ROTATOR CUFF REPAIR     • SHOULDER SURGERY     • WISDOM TOOTH EXTRACTION         Problem List:  Patient Active Problem List   Diagnosis   • Facial aging   • Anxiety   • Asthma   • Bipolar 1 disorder   • Depression   • Hashimoto's thyroiditis   • Migraine headache   • Nicotine dependence   • Predisposition to allergic reaction   • Hypothyroidism   • Thyroid disorder   • Facial aging   • Well woman exam with routine gynecological exam       Allergy:   Allergies   Allergen Reactions   • Meperidine Hives and Nausea And Vomiting   • Lamictal [Lamotrigine] Rash   • Tegaderm Ag Mesh [Silver] Rash        Discontinued Medications:  There are no discontinued medications.    Current Medications:   Current Outpatient Medications   Medication Sig Dispense Refill   • desvenlafaxine (PRISTIQ) 50 MG 24 hr tablet Take 1  tablet by mouth Daily. 30 tablet 2   • albuterol sulfate  (90 Base) MCG/ACT inhaler Inhale 2 puffs Every 6 (Six) Hours As Needed for Wheezing. 18 g 6   • Budeson-Glycopyrrol-Formoterol (Breztri Aerosphere) 160-9-4.8 MCG/ACT aerosol inhaler Inhale 2 puffs 2 (Two) Times a Day. 10.7 g 3   • cholecalciferol (VITAMIN D3) 1.25 MG (14979 UT) capsule Take 1 capsule by mouth 1 (One) Time Per Week. 13 capsule 3   • cyanocobalamin 1000 MCG/ML injection 1xweek for 4 weeks then monthly 1 mL 12   • fluticasone (FLONASE) 50 MCG/ACT nasal spray 1 spray into the nostril(s) as directed by provider 2 (Two) Times a Day for 30 days. (Patient taking differently: 1 spray into the nostril(s) as directed by provider 2 (Two) Times a Day As Needed.) 16 g 0   • levothyroxine (SYNTHROID, LEVOTHROID) 50 MCG tablet Take 1 tablet by mouth Daily. 90 tablet 1   • medroxyPROGESTERone (Depo-Provera) 150 MG/ML injection Inject 1 mL into the appropriate muscle as directed by prescriber Every 3 (Three) Months. 1 mL 3   • montelukast (SINGULAIR) 10 MG tablet Take 1 tablet by mouth Every Evening. 90 tablet 1   • propranolol (INDERAL) 20 MG tablet Take 1 tablet by mouth 3 (Three) Times a Day. 90 tablet 5   • Rimegepant Sulfate (Nurtec) 75 MG tablet dispersible tablet Take 1 tablet by mouth Daily As Needed (Headache). 8 tablet 5     No current facility-administered medications for this visit.       Past Medical History:  Past Medical History:   Diagnosis Date   • Allergy    • Anxiety    • Asthma    • Bipolar 1 disorder    • Depression    • Facial aging    • Gastroesophageal reflux disease    • Migraine headache    • Nicotine dependence 04/08/2021   • Thyroid disorder        Past Psychiatric History:  Medication Trials: Abilify, Rexulti, lamictal, quetiapine, Vraylar for mood, with no benefits.  Prozac, Zoloft and Trintellix for depression and anxiety with little benefit.    Substance Abuse History:   Types: Denies  Withdrawal Symptoms: Denies  Longest  "Period Sober: Denies  AA: Denies      Social History     Socioeconomic History   • Marital status: Single   Tobacco Use   • Smoking status: Former     Packs/day: 0.50     Years: 2.00     Pack years: 1.00     Types: Cigarettes     Quit date: 2020     Years since quitting: 3.2   • Smokeless tobacco: Never   • Tobacco comments:     former, 1 packs per day, smoked for 6-10 years   Vaping Use   • Vaping Use: Never used   Substance and Sexual Activity   • Alcohol use: Yes     Comment: Current some day  rarely drinks   • Drug use: Never   • Sexual activity: Yes     Birth control/protection: Injection         Family History   Problem Relation Age of Onset   • Stroke Mother    • Lung cancer Maternal Grandmother    • Heart disease Other    • Anxiety disorder Father    • Bipolar disorder Paternal Aunt    • Schizophrenia Paternal Aunt        Mental Status Exam:     Appearance: good eye contact, normal street clothes, groomed, sitting in chair   Behavior: pleasant and cooperative  Motor: no abnormal  Speech: normal rhythm, rate, volume, tone, not hyperverbal, not pressured, normal prosidy  Mood: \"Okay\"  Affect: euthymic  Thought Content: negative suicidal ideations, negative homicidal ideations, negative obsessions  Perceptions: negative auditory hallucinations, negative visual hallucinations, negative delusions, negative paranoia  Thought Process: goal directed, linear  Insight/Judgement: fair/fair  Cognition: grossly intact  Attention: intact  Orientation: person, place, time and situation  Memory: intact    Review of Systems:     Constitutional: Denies fatigue, night sweats  Eyes: Denies double vision, blurred vision  HENT: Denies vertigo, recent head injury  Cardiovascular: Denies chest pain, irregular heartbeats  Respiratory: Denies productive cough, shortness of breath  Gastrointestinal: Denies nausea, vomiting  Genitourinary: Denies dysuria, urinary retention  Integument: Denies hair growth change, new skin " lesions  Neurologic: Denies altered mental status, seizures  Musculoskeletal: Denies joint swelling, limitation of motion  Endocrine: Denies cold intolerance, heat intolerance  Psychiatric: Admits anxiety, depression.  Denies psychosis, kiersten, post-traumatic stress disorder, obsessive compulsive disorder.   Allergic-immunologic: Denies frequent illnesses      Vital Signs:   There were no vitals taken for this visit.     Lab Results:   Lab on 11/28/2022   Component Date Value Ref Range Status   • Iron 11/28/2022 45  37 - 145 mcg/dL Final   Lab on 09/07/2022   Component Date Value Ref Range Status   • Color, UA 11/28/2022 Yellow  Yellow, Straw Final   • Appearance, UA 11/28/2022 Clear  Clear Final   • pH, UA 11/28/2022 7.5  5.0 - 8.0 Final   • Specific Gravity, UA 11/28/2022 1.009  1.005 - 1.030 Final   • Glucose, UA 11/28/2022 Negative  Negative Final   • Ketones, UA 11/28/2022 Negative  Negative Final   • Bilirubin, UA 11/28/2022 Negative  Negative Final   • Blood, UA 11/28/2022 Negative  Negative Final   • Protein, UA 11/28/2022 Negative  Negative Final   • Leuk Esterase, UA 11/28/2022 Trace (A)  Negative Final   • Nitrite, UA 11/28/2022 Negative  Negative Final   • Urobilinogen, UA 11/28/2022 0.2 E.U./dL  0.2 - 1.0 E.U./dL Final   • Hemochromatosis Gene 09/07/2022 Comment   Final    Comment: Result:  c.845G>A (p.Rkv545Fea) - Not Detected  c.187C>G (p.Aor65Bkn) - Not Detected  c.193A>T (p.Jpb17Jsg) - Not Detected  Not associated with increased risk to develop clinical symptoms  of Hereditary Hemochromatosis. In symptomatic individuals, other  causes of iron overload should be evaluated. See Additional  Information and Comments.  Additional Clinical Information:  Hereditary hemochromatosis (HFE related) is an autosomal recessive  iron storage disorder. Patients may have a genetic diagnosis of  hereditary hemochromatosis and never show clinical symptoms. Clinical  symptoms typically appear between 40 to 60 years in  males and after  menopause in females. Signs and symptoms may include organ damage,  primarily in the liver, risk for hepatocellular carcinoma, diabetes,  and heart disease due to iron accumulation. Life expectancy may be  decreased in individuals who develop cirrhosis. Treatment for  clinically symptomatic individuals may include therapeutic  phlebotomy. Liver                            transplant may be used to treat end stage liver  failure. For preventive care, monitoring for iron overload is  recommended for patients who are homozygous for c.845G>A  (p.Igu408Trz) and have yet to experience clinical symptoms.  Comments:  The most common HFE variants associated with hereditary  hemochromatosis are c.845G>A (p.Mei906Qwb), c.187C>G (p.Oic89Gbu),  c.193A>T (p.Wwo19Rhk). While patients homozygous for c.845G>A  (p.Mgp654Msq) are the most likely to present clinical symptoms, less  than 10% develop clinically significant iron overload with tissue  and organ damage.  Genetic counseling is recommended to discuss the potential clinical  implications of positive results, as well as recommendations for  testing family members.  Genetic Coordinators are available for health care providers to  discuss results at [phone]).  Test Details:  Three variants analyzed:  c.845G>A (p.Sci363Fss), commonly referred to as C282Y  c.187C>G (p.Qvh71Kuw), commonly referred to as                            H63D  c.193A>T (p.Mnm29Wvr), commonly referred to as S65C  Methods/Limitations:  DNA Analysis of the HFE gene (NM_[id number]) was performed by PCR  amplification followed by restriction enzyme digestion analyses.  Results must be combined with clinical information for the most  accurate interpretation. Molecular-based testing is highly accurate,  but as in any laboratory test, diagnostic errors may occur. False  positive or false negative results may occur for reasons that  include genetic variants, blood transfusions, bone  marrow  transplantation, somatic or tissue-specific mosaicism, mislabeled  samples, or erroneous representation of family relationships.  This test was developed and its performance characteristics  determined by [facility]. It has not been cleared or approved by the  Food and Drug Administration.  References:  Skyler BR, Vishal PC, Leann KV, Vasu LW, Isabela AS; American  Association for the Study of Liver Diseases. Diagnosis and management  of hemochromatosis: 2011                            practice guideline by the American  Association for the Study of Liver Diseases. Hepatology. 2011  Jul;54(1):328-43. doi: 10.1002/hep.88165. PMID: 53055714; PMCID:  LRV0729543.  Marlen G, Jalen P, Jessika DW, Robel H, Jarret O, Meño S,  Car I, Beau M, Jacquelyn S. [facility] best practice guidelines for the  molecular genetic diagnosis of hereditary hemochromatosis (HH). Eur  J Hum Princess. 2016 Apr;24(4):479-95. doi: 10.1038/ejhg.2015.128.  Epub 2015 Jul 8. PMID: 60321032; PMCID: SCG5022069.  Kateryna Christopher, PhD, FACMG  Sadiq Guajardo, PhD  Deyvi Eli, PhD, FAC  Federico Mo, PhD, FAC  Donn Ford, PhD, FAC  SIMA Arzate, PhD, FAC  Socorro Veras, PhD, FAC  Charmaine Trujillo, PhD, FACMG   • RBC, UA 11/28/2022 0-2  None Seen, 0-2 /HPF Final   • WBC, UA 11/28/2022 0-2  None Seen, 0-2 /HPF Final   • Bacteria, UA 11/28/2022 None Seen  None Seen /HPF Final   • Squamous Epithelial Cells, UA 11/28/2022 0-2  None Seen, 0-2 /HPF Final   • Hyaline Casts, UA 11/28/2022 None Seen  None Seen /LPF Final   • Methodology 11/28/2022 Automated Microscopy   Final   Lab on 08/31/2022   Component Date Value Ref Range Status   • Total Cholesterol 08/31/2022 169  0 - 200 mg/dL Final   • Triglycerides 08/31/2022 89  0 - 150 mg/dL Final   • HDL Cholesterol 08/31/2022 58  40 - 60 mg/dL Final   • LDL Cholesterol  08/31/2022 95  0 - 100 mg/dL Final   • VLDL Cholesterol 08/31/2022 16  5 - 40 mg/dL Final   • LDL/HDL Ratio 08/31/2022 1.61    Final   • Glucose 08/31/2022 83  65 - 99 mg/dL Final   • BUN 08/31/2022 11  6 - 20 mg/dL Final   • Creatinine 08/31/2022 0.77  0.57 - 1.00 mg/dL Final   • Sodium 08/31/2022 139  136 - 145 mmol/L Final   • Potassium 08/31/2022 4.3  3.5 - 5.2 mmol/L Final   • Chloride 08/31/2022 108 (H)  98 - 107 mmol/L Final   • CO2 08/31/2022 23.0  22.0 - 29.0 mmol/L Final   • Calcium 08/31/2022 9.5  8.6 - 10.5 mg/dL Final   • Total Protein 08/31/2022 6.7  6.0 - 8.5 g/dL Final   • Albumin 08/31/2022 4.80  3.50 - 5.20 g/dL Final   • ALT (SGPT) 08/31/2022 6  1 - 33 U/L Final   • AST (SGOT) 08/31/2022 11  1 - 32 U/L Final   • Alkaline Phosphatase 08/31/2022 48  39 - 117 U/L Final   • Total Bilirubin 08/31/2022 0.4  0.0 - 1.2 mg/dL Final   • Globulin 08/31/2022 1.9  gm/dL Final   • A/G Ratio 08/31/2022 2.5  g/dL Final   • BUN/Creatinine Ratio 08/31/2022 14.3  7.0 - 25.0 Final   • Anion Gap 08/31/2022 8.0  5.0 - 15.0 mmol/L Final   • eGFR 08/31/2022 109.3  >60.0 mL/min/1.73 Final    National Kidney Foundation and American Society of Nephrology (ASN) Task Force recommended calculation based on the Chronic Kidney Disease Epidemiology Collaboration (CKD-EPI) equation refit without adjustment for race.   • TSH 08/31/2022 1.980  0.270 - 4.200 uIU/mL Final   • Free T4 08/31/2022 1.00  0.93 - 1.70 ng/dL Final   • 25 Hydroxy, Vitamin D 08/31/2022 62.1  30.0 - 100.0 ng/ml Final   • Vitamin B-12 08/31/2022 225  211 - 946 pg/mL Final   • Folate 08/31/2022 8.96  4.78 - 24.20 ng/mL Final   • Iron 08/31/2022 151 (H)  37 - 145 mcg/dL Final   • WBC 08/31/2022 4.87  3.40 - 10.80 10*3/mm3 Final   • RBC 08/31/2022 4.42  3.77 - 5.28 10*6/mm3 Final   • Hemoglobin 08/31/2022 13.2  12.0 - 15.9 g/dL Final   • Hematocrit 08/31/2022 39.8  34.0 - 46.6 % Final   • MCV 08/31/2022 90.0  79.0 - 97.0 fL Final   • MCH 08/31/2022 29.9  26.6 - 33.0 pg Final   • MCHC 08/31/2022 33.2  31.5 - 35.7 g/dL Final   • RDW 08/31/2022 12.5  12.3 - 15.4 % Final   • RDW-SD  08/31/2022 40.8  37.0 - 54.0 fl Final   • MPV 08/31/2022 11.2  6.0 - 12.0 fL Final   • Platelets 08/31/2022 259  140 - 450 10*3/mm3 Final   • Neutrophil % 08/31/2022 54.8  42.7 - 76.0 % Final   • Lymphocyte % 08/31/2022 34.1  19.6 - 45.3 % Final   • Monocyte % 08/31/2022 6.0  5.0 - 12.0 % Final   • Eosinophil % 08/31/2022 4.5  0.3 - 6.2 % Final   • Basophil % 08/31/2022 0.4  0.0 - 1.5 % Final   • Immature Grans % 08/31/2022 0.2  0.0 - 0.5 % Final   • Neutrophils, Absolute 08/31/2022 2.67  1.70 - 7.00 10*3/mm3 Final   • Lymphocytes, Absolute 08/31/2022 1.66  0.70 - 3.10 10*3/mm3 Final   • Monocytes, Absolute 08/31/2022 0.29  0.10 - 0.90 10*3/mm3 Final   • Eosinophils, Absolute 08/31/2022 0.22  0.00 - 0.40 10*3/mm3 Final   • Basophils, Absolute 08/31/2022 0.02  0.00 - 0.20 10*3/mm3 Final   • Immature Grans, Absolute 08/31/2022 0.01  0.00 - 0.05 10*3/mm3 Final   • nRBC 08/31/2022 0.0  0.0 - 0.2 /100 WBC Final   • Iron 08/31/2022 156 (H)  37 - 145 mcg/dL Final   • Iron Saturation 08/31/2022 40  20 - 50 % Final   • Transferrin 08/31/2022 265  200 - 360 mg/dL Final   • TIBC 08/31/2022 395  298 - 536 mcg/dL Final   • Ferritin 08/31/2022 70.30  13.00 - 150.00 ng/mL Final       EKG Results:  No orders to display       Imaging Results:  No Images in the past 120 days found..      Assessment & Plan   Diagnoses and all orders for this visit:    1. Generalized anxiety disorder (Primary)    2. Major depressive disorder, recurrent episode, severe, with psychosis    3. Insomnia due to other mental disorder    4. Post traumatic stress disorder (PTSD)       Continue desvenlafaxine to target depression and anxiety. Stopped vraylar because made mood worsen. Psychotherapy for PTSD. Sleep hygiene education for insomnia. 16 minutes of supportive psychotherapy with goal to strengthen defenses, promote problems solving, restore adaptive functioning and provide symptom relief. The therapeutic alliance was strengthened to encourage the  patient to express their thoughts and feelings. Esteem building was enhanced through praise, reassurance, normalizing and encouragement. Coping skills were enhanced to build distress tolerance skills and emotional regulation. Allowed patient to freely discuss issues without interruption or judgement with unconditional positive regard, active listening skills, and empathy. Provided a safe, confidential environment to facilitate the development of a positive therapeutic relationship and encourage open, honest communication. Assisted patient in identifying risk factors which would indicate the need for higher level of care including thoughts to harm self or others and/or self-harming behavior and encouraged patient to contact this office, call 911, or present to the nearest emergency room should any of these events occur. Assisted patient in processing session content; acknowledged and normalized patient's thoughts, feelings, and concerns by utilizing a person-centered approach in efforts to build appropriate rapport and a positive therapeutic relationship with open and honest communication. Patient given education on medication side effects, diagnosis/illness and relapse symptoms. Plan to continue supportive psychotherapy in next appointment to provide symptom relief. 4 weeks    Diagnoses: as above  Symptoms: as above  Functional status: good  Mental Status Exam: as above     Treatment plan: medication management and supportive psychotherapy  Prognosis: good  Progress: continued improvement    Visit Diagnoses:    ICD-10-CM ICD-9-CM   1. Generalized anxiety disorder  F41.1 300.02   2. Major depressive disorder, recurrent episode, severe, with psychosis  F33.3 296.34   3. Insomnia due to other mental disorder  F51.05 300.9    F99 327.02   4. Post traumatic stress disorder (PTSD)  F43.10 309.81       PLAN:  12. Safety: No acute safety concerns.   13. Therapy: Continue [place] [facility] and  Counseling  14. Risk Assessment: Risk of self-harm acutely is moderate.  Risk factors include anxiety disorder, mood disorder, history of self harm and recent psychosocial stressors (pandemic). Protective factors include no family history, denies access to guns/weapons, no present SI, no history of suicide attempts, minimal AODA, healthcare seeking, future orientation, willingness to engage in care.  Risk of self-harm chronically is also moderate, but could be further elevated in the event of treatment noncompliance and/or AODA.  15. Medications: Continue desvenlafaxine 50 mg p.o. daily to target depression and anxiety.  Discussed all risks, benefits, alternatives, and side effects of Pristiq including but not limited to GI symptoms (N/V/D, constipation), sexual dysfunction, dizziness, insomnia, hyperhidrosis, anxiety, bleeding risk, seizure risk, CNS depression, dyslipidemia, activation of kiersten or hypomania, increased fragility fracture risk, hyponatremia, ocular effects, HTN, withdrawal syndrome following abrupt discontinuation, serotonin syndrome, and activation of suicidal ideation and behavior.  Discussed the need for pt to immediately call the office for any new or worsening symptoms, such as worsening depression; feeling nervous or restless; suicidal thoughts or actions; or other changes changes in mood or behavior, and all other concerns. Pt educated on med compliance and the risks of suddenly stopping this medication or missing doses. Pt verbalized understanding and is agreeable to taking Pristiq. Addressed all questions and concerns. Stop quetiapine. Patient stopped vraylar.   16. Labs/studies: No labs/studies ordered at this time  17. Follow-up: 1 month      TREATMENT PLAN/GOALS: Continue supportive psychotherapy efforts and medications as indicated. Treatment and medication options discussed during today's visit. Patient ackowledged and verbally consented to continue with current treatment plan and was  educated on the importance of compliance with treatment and follow-up appointments.    MEDICATION ISSUES:  RODNEY reviewed as expected.  Discussed medication options and treatment plan of prescribed medication as well as the risks, benefits, and side effects including potential falls, possible impaired driving and metabolic adversities among others. Patient is agreeable to call the office with any worsening of symptoms or onset of side effects. Patient is agreeable to call 911 or go to the nearest ER should he/she begin having SI/HI. No medication side effects or related complaints today.     MEDS ORDERED DURING VISIT:  No orders of the defined types were placed in this encounter.      1 month follow up       This document has been electronically signed by LACHELLE Conde  April 4, 2023 13:30 EDT      Part of this note may be an electronic transcription/translation of spoken language to printed text using the Dragon Dictation System.

## 2023-04-07 ENCOUNTER — TELEPHONE (OUTPATIENT)
Dept: PSYCHIATRY | Facility: CLINIC | Age: 28
End: 2023-04-07
Payer: COMMERCIAL

## 2023-04-07 DIAGNOSIS — Z51.81 MEDICATION MONITORING ENCOUNTER: Primary | ICD-10-CM

## 2023-04-07 NOTE — TELEPHONE ENCOUNTER
UDS DUE.  PLEASE SIGN    PT ALSO WANTS TO KNOW IF YOU HAVE HEARD FROM Ariana Tam ABOUT HER TESTING

## 2023-04-07 NOTE — TELEPHONE ENCOUNTER
KishaTe APRN to [facility] Clinical Pool   2 hours ago   Haven't received anything from Dr. Barillas yet.

## 2023-04-14 ENCOUNTER — LAB (OUTPATIENT)
Dept: LAB | Facility: HOSPITAL | Age: 28
End: 2023-04-14
Payer: COMMERCIAL

## 2023-04-14 DIAGNOSIS — Z51.81 MEDICATION MONITORING ENCOUNTER: ICD-10-CM

## 2023-04-14 LAB
AMPHET+METHAMPHET UR QL: NEGATIVE
BARBITURATES UR QL SCN: NEGATIVE
BENZODIAZ UR QL SCN: NEGATIVE
CANNABINOIDS SERPL QL: NEGATIVE
COCAINE UR QL: NEGATIVE
METHADONE UR QL SCN: NEGATIVE
OPIATES UR QL: NEGATIVE
OXYCODONE UR QL SCN: NEGATIVE

## 2023-04-14 PROCEDURE — 80307 DRUG TEST PRSMV CHEM ANLYZR: CPT

## 2023-05-02 ENCOUNTER — OFFICE VISIT (OUTPATIENT)
Dept: PSYCHIATRY | Facility: CLINIC | Age: 28
End: 2023-05-02
Payer: COMMERCIAL

## 2023-05-02 VITALS
HEIGHT: 70 IN | HEART RATE: 106 BPM | BODY MASS INDEX: 21.73 KG/M2 | DIASTOLIC BLOOD PRESSURE: 80 MMHG | WEIGHT: 151.8 LBS | SYSTOLIC BLOOD PRESSURE: 123 MMHG

## 2023-05-02 DIAGNOSIS — F33.3 MAJOR DEPRESSIVE DISORDER, RECURRENT EPISODE, SEVERE, WITH PSYCHOSIS: Primary | ICD-10-CM

## 2023-05-02 DIAGNOSIS — F90.0 ADHD (ATTENTION DEFICIT HYPERACTIVITY DISORDER), INATTENTIVE TYPE: ICD-10-CM

## 2023-05-02 DIAGNOSIS — F99 INSOMNIA DUE TO OTHER MENTAL DISORDER: ICD-10-CM

## 2023-05-02 DIAGNOSIS — F43.10 POST TRAUMATIC STRESS DISORDER (PTSD): ICD-10-CM

## 2023-05-02 DIAGNOSIS — F41.1 GENERALIZED ANXIETY DISORDER: ICD-10-CM

## 2023-05-02 DIAGNOSIS — F51.05 INSOMNIA DUE TO OTHER MENTAL DISORDER: ICD-10-CM

## 2023-05-02 RX ORDER — DESVENLAFAXINE SUCCINATE 50 MG/1
50 TABLET, EXTENDED RELEASE ORAL DAILY
Qty: 30 TABLET | Refills: 2 | Status: SHIPPED | OUTPATIENT
Start: 2023-05-02

## 2023-05-02 RX ORDER — DEXTROAMPHETAMINE SACCHARATE, AMPHETAMINE ASPARTATE MONOHYDRATE, DEXTROAMPHETAMINE SULFATE AND AMPHETAMINE SULFATE 2.5; 2.5; 2.5; 2.5 MG/1; MG/1; MG/1; MG/1
10 CAPSULE, EXTENDED RELEASE ORAL DAILY
Qty: 30 CAPSULE | Refills: 0 | Status: SHIPPED | OUTPATIENT
Start: 2023-05-02 | End: 2023-06-01

## 2023-05-02 RX ORDER — MEDROXYPROGESTERONE ACETATE 150 MG/ML
INJECTION, SUSPENSION INTRAMUSCULAR
COMMUNITY
Start: 2023-04-28

## 2023-05-02 NOTE — PROGRESS NOTES
Subjective   Maurice Lazo is a 27 y.o. female who presents today for follow up. This provider is located at [address] [place] [address], [address] in [place], [place]. In-person visit consisting of the patient and I only. The patient is accepting of and agreeable to this appointment.     Chief Complaint:  Bipolar Disorder, anxiety, PTSD    History of Present Illness:     1. Depression/mood: has improved  a. Things going okay at home and work   2. Anxiety: has improved  a. Denies excessive worries  b. Working on positive coping skills  3. PTSD: has improved  4. Sleep disturbance: denies  5. Low energy: denies  6. Low motivation/Interest: denies  7. Appetite change: denies  8. Medication compliant  9. Side effects: denies  10. Refills needed  11. Denies SI HI AVH    ADHD:   Inattention:   Often fails to give close attention to details or makes careless mistakes in schoolwork, at work, or during other activities- y  Often has difficulty sustaining attention in tasks- y  Often does not seem to listen when spoken to directly- y  Often does not follow through on instructions and fails to finish duties in the workplace- n  Often has difficulty organizing tasks and activities- y  Often avoids, dislikes or is reluctant to engage in tasks that require sustained mental effort- y  Often loses things necessary for tasks or activities- y  Is often easily distracted by extraneous stimuli- y  Is often forgetful in daily activities- y  Hyperactivity and Impulsivity:   Often fidgets with or taps hands or feet-y  Often leaves seat in situations when remaining seated is expected- y  Often feels restless- y  Is often “on the go”, acting as if “driven by a motor”- y  Often talks excessively- y  Often blurts out an answer before a question has been completed- y  Often has difficulty waiting their turn- y  Often interrupts or intrudes on others- y    Neuropsychological evaluation 2/28/23 finding patient meets criteria for PTSD, bipolar  disorder, ADHD and JARROD.     Access to Firearms: Denies    Past Surgical History:  Past Surgical History:   Procedure Laterality Date   • DILATATION AND CURETTAGE     • OTHER SURGICAL HISTORY      Surgical Clips   • ROTATOR CUFF REPAIR     • SHOULDER SURGERY     • WISDOM TOOTH EXTRACTION         Problem List:  Patient Active Problem List   Diagnosis   • Facial aging   • Anxiety   • Asthma   • Bipolar 1 disorder   • Depression   • Hashimoto's thyroiditis   • Migraine headache   • Nicotine dependence   • Predisposition to allergic reaction   • Hypothyroidism   • Thyroid disorder   • Facial aging   • Well woman exam with routine gynecological exam       Allergy:   Allergies   Allergen Reactions   • Meperidine Hives and Nausea And Vomiting   • Lamictal [Lamotrigine] Rash   • Tegaderm Ag Mesh [Silver] Rash        Discontinued Medications:  Medications Discontinued During This Encounter   Medication Reason   • desvenlafaxine (PRISTIQ) 50 MG 24 hr tablet Reorder       Current Medications:   Current Outpatient Medications   Medication Sig Dispense Refill   • albuterol sulfate  (90 Base) MCG/ACT inhaler Inhale 2 puffs Every 6 (Six) Hours As Needed for Wheezing. 18 g 6   • Budeson-Glycopyrrol-Formoterol (Breztri Aerosphere) 160-9-4.8 MCG/ACT aerosol inhaler Inhale 2 puffs 2 (Two) Times a Day. 10.7 g 3   • cholecalciferol (VITAMIN D3) 1.25 MG (05502 UT) capsule Take 1 capsule by mouth 1 (One) Time Per Week. 13 capsule 3   • cyanocobalamin 1000 MCG/ML injection 1xweek for 4 weeks then monthly 1 mL 12   • desvenlafaxine (PRISTIQ) 50 MG 24 hr tablet Take 1 tablet by mouth Daily. 30 tablet 2   • fluticasone (FLONASE) 50 MCG/ACT nasal spray 1 spray into the nostril(s) as directed by provider 2 (Two) Times a Day for 30 days. (Patient taking differently: 1 spray into the nostril(s) as directed by provider 2 (Two) Times a Day As Needed. TAKING PRN) 16 g 0   • levothyroxine (SYNTHROID, LEVOTHROID) 50 MCG tablet Take 1 tablet by  mouth Daily. 90 tablet 1   • medroxyPROGESTERone (Depo-Provera) 150 MG/ML injection Inject 1 mL into the appropriate muscle as directed by prescriber Every 3 (Three) Months. 1 mL 3   • montelukast (SINGULAIR) 10 MG tablet Take 1 tablet by mouth Every Evening. 90 tablet 1   • propranolol (INDERAL) 20 MG tablet Take 1 tablet by mouth 3 (Three) Times a Day. 90 tablet 5   • Rimegepant Sulfate (Nurtec) 75 MG tablet dispersible tablet Take 1 tablet by mouth Daily As Needed (Headache). 8 tablet 5   • amphetamine-dextroamphetamine XR (Adderall XR) 10 MG 24 hr capsule Take 1 capsule by mouth Daily for 30 days 30 capsule 0   • medroxyPROGESTERone Acetate 150 MG/ML suspension prefilled syringe  (Patient not taking: Reported on 5/2/2023)       No current facility-administered medications for this visit.       Past Medical History:  Past Medical History:   Diagnosis Date   • Allergy    • Anxiety    • Asthma    • Bipolar 1 disorder    • Depression    • Facial aging    • Gastroesophageal reflux disease    • Migraine headache    • Nicotine dependence 04/08/2021   • Thyroid disorder        Past Psychiatric History:  Medication Trials: Abilify, Rexulti, lamictal, quetiapine, Vraylar for mood, with no benefits.  Prozac, Zoloft and Trintellix for depression and anxiety with little benefit.    Substance Abuse History:   Types: Denies  Withdrawal Symptoms: Denies  Longest Period Sober: Denies  AA: Denies      Social History     Socioeconomic History   • Marital status: Single   Tobacco Use   • Smoking status: Former     Packs/day: 0.50     Years: 2.00     Pack years: 1.00     Types: Cigarettes     Quit date: 2020     Years since quitting: 3.3   • Smokeless tobacco: Never   • Tobacco comments:     former, 1 packs per day, smoked for 6-10 years   Vaping Use   • Vaping Use: Never used   Substance and Sexual Activity   • Alcohol use: Yes     Comment: Current some day  rarely drinks   • Drug use: Never   • Sexual activity: Yes     Birth  "control/protection: Injection         Family History   Problem Relation Age of Onset   • Stroke Mother    • Lung cancer Maternal Grandmother    • Heart disease Other    • Anxiety disorder Father    • Bipolar disorder Paternal Aunt    • Schizophrenia Paternal Aunt        Mental Status Exam:     Appearance: good eye contact, normal street clothes, groomed, sitting in chair   Behavior: pleasant and cooperative  Motor: no abnormal  Speech: normal rhythm, rate, volume, tone, not hyperverbal, not pressured, normal prosidy  Mood: \"Okay\"  Affect: euthymic  Thought Content: negative suicidal ideations, negative homicidal ideations, negative obsessions  Perceptions: negative auditory hallucinations, negative visual hallucinations, negative delusions, negative paranoia  Thought Process: goal directed, linear  Insight/Judgement: fair/fair  Cognition: grossly intact  Attention: intact  Orientation: person, place, time and situation  Memory: intact    Review of Systems:     Constitutional: Denies fatigue, night sweats  Eyes: Denies double vision, blurred vision  HENT: Denies vertigo, recent head injury  Cardiovascular: Denies chest pain, irregular heartbeats  Respiratory: Denies productive cough, shortness of breath  Gastrointestinal: Denies nausea, vomiting  Genitourinary: Denies dysuria, urinary retention  Integument: Denies hair growth change, new skin lesions  Neurologic: Denies altered mental status, seizures  Musculoskeletal: Denies joint swelling, limitation of motion  Endocrine: Denies cold intolerance, heat intolerance  Psychiatric: Admits anxiety, depression.  Denies psychosis, kiersten, post-traumatic stress disorder, obsessive compulsive disorder.   Allergic-immunologic: Denies frequent illnesses      Vital Signs:   /80   Pulse 106   Ht 177.8 cm (70\")   Wt 68.9 kg (151 lb 12.8 oz)   BMI 21.78 kg/m²      Lab Results:   Lab on 04/14/2023   Component Date Value Ref Range Status   • Amphet/Methamphet, Screen " 04/14/2023 Negative  Negative Final   • Barbiturates Screen, Urine 04/14/2023 Negative  Negative Final   • Benzodiazepine Screen, Urine 04/14/2023 Negative  Negative Final   • Cocaine Screen, Urine 04/14/2023 Negative  Negative Final   • Opiate Screen 04/14/2023 Negative  Negative Final   • THC, Screen, Urine 04/14/2023 Negative  Negative Final   • Methadone Screen, Urine 04/14/2023 Negative  Negative Final   • Oxycodone Screen, Urine 04/14/2023 Negative  Negative Final   Lab on 11/28/2022   Component Date Value Ref Range Status   • Iron 11/28/2022 45  37 - 145 mcg/dL Final   Lab on 09/07/2022   Component Date Value Ref Range Status   • Color, UA 11/28/2022 Yellow  Yellow, Straw Final   • Appearance, UA 11/28/2022 Clear  Clear Final   • pH, UA 11/28/2022 7.5  5.0 - 8.0 Final   • Specific Gravity, UA 11/28/2022 1.009  1.005 - 1.030 Final   • Glucose, UA 11/28/2022 Negative  Negative Final   • Ketones, UA 11/28/2022 Negative  Negative Final   • Bilirubin, UA 11/28/2022 Negative  Negative Final   • Blood, UA 11/28/2022 Negative  Negative Final   • Protein, UA 11/28/2022 Negative  Negative Final   • Leuk Esterase, UA 11/28/2022 Trace (A)  Negative Final   • Nitrite, UA 11/28/2022 Negative  Negative Final   • Urobilinogen, UA 11/28/2022 0.2 E.U./dL  0.2 - 1.0 E.U./dL Final   • Hemochromatosis Gene 09/07/2022 Comment   Final    Comment: Result:  c.845G>A (p.Nct371Ila) - Not Detected  c.187C>G (p.Goc23Vko) - Not Detected  c.193A>T (p.Gai53Ebu) - Not Detected  Not associated with increased risk to develop clinical symptoms  of Hereditary Hemochromatosis. In symptomatic individuals, other  causes of iron overload should be evaluated. See Additional  Information and Comments.  Additional Clinical Information:  Hereditary hemochromatosis (HFE related) is an autosomal recessive  iron storage disorder. Patients may have a genetic diagnosis of  hereditary hemochromatosis and never show clinical symptoms. Clinical  symptoms  typically appear between 40 to 60 years in males and after  menopause in females. Signs and symptoms may include organ damage,  primarily in the liver, risk for hepatocellular carcinoma, diabetes,  and heart disease due to iron accumulation. Life expectancy may be  decreased in individuals who develop cirrhosis. Treatment for  clinically symptomatic individuals may include therapeutic  phlebotomy. Liver                            transplant may be used to treat end stage liver  failure. For preventive care, monitoring for iron overload is  recommended for patients who are homozygous for c.845G>A  (p.Ihf857Kkd) and have yet to experience clinical symptoms.  Comments:  The most common HFE variants associated with hereditary  hemochromatosis are c.845G>A (p.Gtu563Qrv), c.187C>G (p.Sqn27Lpg),  c.193A>T (p.Eup54Rzr). While patients homozygous for c.845G>A  (p.Sdb083Ocb) are the most likely to present clinical symptoms, less  than 10% develop clinically significant iron overload with tissue  and organ damage.  Genetic counseling is recommended to discuss the potential clinical  implications of positive results, as well as recommendations for  testing family members.  Genetic Coordinators are available for health care providers to  discuss results at [phone]).  Test Details:  Three variants analyzed:  c.845G>A (p.Zja448Dpn), commonly referred to as C282Y  c.187C>G (p.Uls01Oua), commonly referred to as                            H63D  c.193A>T (p.Szm47Obh), commonly referred to as S65C  Methods/Limitations:  DNA Analysis of the HFE gene (NM_[id number]) was performed by PCR  amplification followed by restriction enzyme digestion analyses.  Results must be combined with clinical information for the most  accurate interpretation. Molecular-based testing is highly accurate,  but as in any laboratory test, diagnostic errors may occur. False  positive or false negative results may occur for reasons that  include  genetic variants, blood transfusions, bone marrow  transplantation, somatic or tissue-specific mosaicism, mislabeled  samples, or erroneous representation of family relationships.  This test was developed and its performance characteristics  determined by [facility]. It has not been cleared or approved by the  Food and Drug Administration.  References:  Skyler BR, Vishal PC, Leann KV, Vasu LW, Isabela AS; American  Association for the Study of Liver Diseases. Diagnosis and management  of hemochromatosis: 2011                            practice guideline by the American  Association for the Study of Liver Diseases. Hepatology. 2011  Jul;54(1):328-43. doi: 10.1002/hep.53724. PMID: 76817348; PMCID:  CXN6759964.  Marlen G, Jalen P, Jessika DW, Robel H, Jarret O, Meño S,  Car I, Beau M, Jacquelyn S. [facility] best practice guidelines for the  molecular genetic diagnosis of hereditary hemochromatosis (HH). Eur  J Hum Princess. 2016 Apr;24(4):479-95. doi: 10.1038/ejhg.2015.128.  Epub 2015 Jul 8. PMID: 78346832; PMCID: [id number].  Kateryna Christopher, PhD, FACMG  Sadiq Guajardo, PhD  Deyvi Eli, PhD, FAC  Federico Mo, PhD, FAC  Donn Ford, PhD, FAC  SIMA Arzate, PhD, FACMG  Socorro Veras, PhD, FAC  Charmaine Trujillo, PhD, FACMG   • RBC, UA 11/28/2022 0-2  None Seen, 0-2 /HPF Final   • WBC, UA 11/28/2022 0-2  None Seen, 0-2 /HPF Final   • Bacteria, UA 11/28/2022 None Seen  None Seen /HPF Final   • Squamous Epithelial Cells, UA 11/28/2022 0-2  None Seen, 0-2 /HPF Final   • Hyaline Casts, UA 11/28/2022 None Seen  None Seen /LPF Final   • Methodology 11/28/2022 Automated Microscopy   Final   Lab on 08/31/2022   Component Date Value Ref Range Status   • Total Cholesterol 08/31/2022 169  0 - 200 mg/dL Final   • Triglycerides 08/31/2022 89  0 - 150 mg/dL Final   • HDL Cholesterol 08/31/2022 58  40 - 60 mg/dL Final   • LDL Cholesterol  08/31/2022 95  0 - 100 mg/dL Final   • VLDL Cholesterol 08/31/2022 16  5 - 40  mg/dL Final   • LDL/HDL Ratio 08/31/2022 1.61   Final   • Glucose 08/31/2022 83  65 - 99 mg/dL Final   • BUN 08/31/2022 11  6 - 20 mg/dL Final   • Creatinine 08/31/2022 0.77  0.57 - 1.00 mg/dL Final   • Sodium 08/31/2022 139  136 - 145 mmol/L Final   • Potassium 08/31/2022 4.3  3.5 - 5.2 mmol/L Final   • Chloride 08/31/2022 108 (H)  98 - 107 mmol/L Final   • CO2 08/31/2022 23.0  22.0 - 29.0 mmol/L Final   • Calcium 08/31/2022 9.5  8.6 - 10.5 mg/dL Final   • Total Protein 08/31/2022 6.7  6.0 - 8.5 g/dL Final   • Albumin 08/31/2022 4.80  3.50 - 5.20 g/dL Final   • ALT (SGPT) 08/31/2022 6  1 - 33 U/L Final   • AST (SGOT) 08/31/2022 11  1 - 32 U/L Final   • Alkaline Phosphatase 08/31/2022 48  39 - 117 U/L Final   • Total Bilirubin 08/31/2022 0.4  0.0 - 1.2 mg/dL Final   • Globulin 08/31/2022 1.9  gm/dL Final   • A/G Ratio 08/31/2022 2.5  g/dL Final   • BUN/Creatinine Ratio 08/31/2022 14.3  7.0 - 25.0 Final   • Anion Gap 08/31/2022 8.0  5.0 - 15.0 mmol/L Final   • eGFR 08/31/2022 109.3  >60.0 mL/min/1.73 Final    National Kidney Foundation and American Society of Nephrology (ASN) Task Force recommended calculation based on the Chronic Kidney Disease Epidemiology Collaboration (CKD-EPI) equation refit without adjustment for race.   • TSH 08/31/2022 1.980  0.270 - 4.200 uIU/mL Final   • Free T4 08/31/2022 1.00  0.93 - 1.70 ng/dL Final   • 25 Hydroxy, Vitamin D 08/31/2022 62.1  30.0 - 100.0 ng/ml Final   • Vitamin B-12 08/31/2022 225  211 - 946 pg/mL Final   • Folate 08/31/2022 8.96  4.78 - 24.20 ng/mL Final   • Iron 08/31/2022 151 (H)  37 - 145 mcg/dL Final   • WBC 08/31/2022 4.87  3.40 - 10.80 10*3/mm3 Final   • RBC 08/31/2022 4.42  3.77 - 5.28 10*6/mm3 Final   • Hemoglobin 08/31/2022 13.2  12.0 - 15.9 g/dL Final   • Hematocrit 08/31/2022 39.8  34.0 - 46.6 % Final   • MCV 08/31/2022 90.0  79.0 - 97.0 fL Final   • MCH 08/31/2022 29.9  26.6 - 33.0 pg Final   • MCHC 08/31/2022 33.2  31.5 - 35.7 g/dL Final   • RDW 08/31/2022  12.5  12.3 - 15.4 % Final   • RDW-SD 08/31/2022 40.8  37.0 - 54.0 fl Final   • MPV 08/31/2022 11.2  6.0 - 12.0 fL Final   • Platelets 08/31/2022 259  140 - 450 10*3/mm3 Final   • Neutrophil % 08/31/2022 54.8  42.7 - 76.0 % Final   • Lymphocyte % 08/31/2022 34.1  19.6 - 45.3 % Final   • Monocyte % 08/31/2022 6.0  5.0 - 12.0 % Final   • Eosinophil % 08/31/2022 4.5  0.3 - 6.2 % Final   • Basophil % 08/31/2022 0.4  0.0 - 1.5 % Final   • Immature Grans % 08/31/2022 0.2  0.0 - 0.5 % Final   • Neutrophils, Absolute 08/31/2022 2.67  1.70 - 7.00 10*3/mm3 Final   • Lymphocytes, Absolute 08/31/2022 1.66  0.70 - 3.10 10*3/mm3 Final   • Monocytes, Absolute 08/31/2022 0.29  0.10 - 0.90 10*3/mm3 Final   • Eosinophils, Absolute 08/31/2022 0.22  0.00 - 0.40 10*3/mm3 Final   • Basophils, Absolute 08/31/2022 0.02  0.00 - 0.20 10*3/mm3 Final   • Immature Grans, Absolute 08/31/2022 0.01  0.00 - 0.05 10*3/mm3 Final   • nRBC 08/31/2022 0.0  0.0 - 0.2 /100 WBC Final   • Iron 08/31/2022 156 (H)  37 - 145 mcg/dL Final   • Iron Saturation 08/31/2022 40  20 - 50 % Final   • Transferrin 08/31/2022 265  200 - 360 mg/dL Final   • TIBC 08/31/2022 395  298 - 536 mcg/dL Final   • Ferritin 08/31/2022 70.30  13.00 - 150.00 ng/mL Final       EKG Results:  No orders to display       Imaging Results:  No Images in the past 120 days found..      Assessment & Plan   Diagnoses and all orders for this visit:    1. Major depressive disorder, recurrent episode, severe, with psychosis (Primary)  -     desvenlafaxine (PRISTIQ) 50 MG 24 hr tablet; Take 1 tablet by mouth Daily.  Dispense: 30 tablet; Refill: 2    2. Generalized anxiety disorder    3. Insomnia due to other mental disorder    4. Post traumatic stress disorder (PTSD)    5. ADHD (attention deficit hyperactivity disorder), inattentive type  -     amphetamine-dextroamphetamine XR (Adderall XR) 10 MG 24 hr capsule; Take 1 capsule by mouth Daily for 30 days  Dispense: 30 capsule; Refill: 0       Continue  desvenlafaxine to target depression and anxiety. Psychotherapy for PTSD. Sleep hygiene education for insomnia. Start adderall to target ADHD. 16 minutes of supportive psychotherapy with goal to strengthen defenses, promote problems solving, restore adaptive functioning and provide symptom relief. The therapeutic alliance was strengthened to encourage the patient to express their thoughts and feelings. Esteem building was enhanced through praise, reassurance, normalizing and encouragement. Coping skills were enhanced to build distress tolerance skills and emotional regulation. Allowed patient to freely discuss issues without interruption or judgement with unconditional positive regard, active listening skills, and empathy. Provided a safe, confidential environment to facilitate the development of a positive therapeutic relationship and encourage open, honest communication. Assisted patient in identifying risk factors which would indicate the need for higher level of care including thoughts to harm self or others and/or self-harming behavior and encouraged patient to contact this office, call 911, or present to the nearest emergency room should any of these events occur. Assisted patient in processing session content; acknowledged and normalized patient's thoughts, feelings, and concerns by utilizing a person-centered approach in efforts to build appropriate rapport and a positive therapeutic relationship with open and honest communication. Patient given education on medication side effects, diagnosis/illness and relapse symptoms. Plan to continue supportive psychotherapy in next appointment to provide symptom relief. 4 weeks    Diagnoses: as above  Symptoms: as above  Functional status: good  Mental Status Exam: as above     Treatment plan: medication management and supportive psychotherapy  Prognosis: good  Progress: continued improvement    Visit Diagnoses:    ICD-10-CM ICD-9-CM   1. Major depressive disorder,  recurrent episode, severe, with psychosis  F33.3 296.34   2. Generalized anxiety disorder  F41.1 300.02   3. Insomnia due to other mental disorder  F51.05 300.9    F99 327.02   4. Post traumatic stress disorder (PTSD)  F43.10 309.81   5. ADHD (attention deficit hyperactivity disorder), inattentive type  F90.0 314.00       PLAN:  12. Safety: No acute safety concerns.   13. Therapy: Continue [facility]  14. Risk Assessment: Risk of self-harm acutely is moderate.  Risk factors include anxiety disorder, mood disorder, history of self harm and recent psychosocial stressors (pandemic). Protective factors include no family history, denies access to guns/weapons, no present SI, no history of suicide attempts, minimal AODA, healthcare seeking, future orientation, willingness to engage in care.  Risk of self-harm chronically is also moderate, but could be further elevated in the event of treatment noncompliance and/or AODA.  15. Medications: Continue desvenlafaxine 50 mg p.o. daily to target depression and anxiety.  Discussed all risks, benefits, alternatives, and side effects of Pristiq including but not limited to GI symptoms (N/V/D, constipation), sexual dysfunction, dizziness, insomnia, hyperhidrosis, anxiety, bleeding risk, seizure risk, CNS depression, dyslipidemia, activation of kiersten or hypomania, increased fragility fracture risk, hyponatremia, ocular effects, HTN, withdrawal syndrome following abrupt discontinuation, serotonin syndrome, and activation of suicidal ideation and behavior.  Discussed the need for pt to immediately call the office for any new or worsening symptoms, such as worsening depression; feeling nervous or restless; suicidal thoughts or actions; or other changes changes in mood or behavior, and all other concerns. Pt educated on med compliance and the risks of suddenly stopping this medication or missing doses. Pt verbalized understanding and is agreeable to  taking Pristiq. Addressed all questions and concerns. Start adderall xr 10mg po qday to target ADHD. Risks, benefits, side effects discussed with patient including elevated heart rate, elevated blood pressure, irritability, insomnia, sexual dysfunction, appetite suppressing properties, psychosis.  After discussion of these risks and benefits, the patient voiced understanding and agreed to proceed. Rodney reviewed, UDS ordered, and controlled substance agreement signed & witnessed.  16. Labs/studies: 4/14/23 negative  17. Follow-up: 1 month      TREATMENT PLAN/GOALS: Continue supportive psychotherapy efforts and medications as indicated. Treatment and medication options discussed during today's visit. Patient ackowledged and verbally consented to continue with current treatment plan and was educated on the importance of compliance with treatment and follow-up appointments.    MEDICATION ISSUES:  RODNEY reviewed as expected.  Discussed medication options and treatment plan of prescribed medication as well as the risks, benefits, and side effects including potential falls, possible impaired driving and metabolic adversities among others. Patient is agreeable to call the office with any worsening of symptoms or onset of side effects. Patient is agreeable to call 911 or go to the nearest ER should he/she begin having SI/HI. No medication side effects or related complaints today.     MEDS ORDERED DURING VISIT:  New Medications Ordered This Visit   Medications   • amphetamine-dextroamphetamine XR (Adderall XR) 10 MG 24 hr capsule     Sig: Take 1 capsule by mouth Daily for 30 days     Dispense:  30 capsule     Refill:  0   • desvenlafaxine (PRISTIQ) 50 MG 24 hr tablet     Sig: Take 1 tablet by mouth Daily.     Dispense:  30 tablet     Refill:  2       1 month follow up       This document has been electronically signed by LACHELLE Conde  May 2, 2023 15:38 EDT      Part of this note may be an electronic  transcription/translation of spoken language to printed text using the Dragon Dictation System.

## 2023-06-01 ENCOUNTER — TELEMEDICINE (OUTPATIENT)
Dept: PSYCHIATRY | Facility: CLINIC | Age: 28
End: 2023-06-01

## 2023-06-01 DIAGNOSIS — F33.3 MAJOR DEPRESSIVE DISORDER, RECURRENT EPISODE, SEVERE, WITH PSYCHOSIS: ICD-10-CM

## 2023-06-01 DIAGNOSIS — F51.05 INSOMNIA DUE TO OTHER MENTAL DISORDER: ICD-10-CM

## 2023-06-01 DIAGNOSIS — F99 INSOMNIA DUE TO OTHER MENTAL DISORDER: ICD-10-CM

## 2023-06-01 DIAGNOSIS — F41.1 GENERALIZED ANXIETY DISORDER: Primary | ICD-10-CM

## 2023-06-01 DIAGNOSIS — F90.0 ADHD (ATTENTION DEFICIT HYPERACTIVITY DISORDER), INATTENTIVE TYPE: ICD-10-CM

## 2023-06-01 DIAGNOSIS — F43.10 POST TRAUMATIC STRESS DISORDER (PTSD): ICD-10-CM

## 2023-06-01 RX ORDER — DEXTROAMPHETAMINE SACCHARATE, AMPHETAMINE ASPARTATE MONOHYDRATE, DEXTROAMPHETAMINE SULFATE AND AMPHETAMINE SULFATE 5; 5; 5; 5 MG/1; MG/1; MG/1; MG/1
20 CAPSULE, EXTENDED RELEASE ORAL EVERY MORNING
Qty: 30 CAPSULE | Refills: 0 | Status: SHIPPED | OUTPATIENT
Start: 2023-06-01 | End: 2023-07-01

## 2023-06-01 NOTE — PROGRESS NOTES
"Subjective   Maurice Lazo is a 27 y.o. female who presents today for follow up. Mode of visit: Video  [address] [facility] [address], [address], [place] [address].  Location of patient: Home  Does the patient consent to use a video/audio connection for your medical care today? Yes  The visit included audio and video interaction. No technical issues occurred during this visit.  This provider is located at [address] [facility], [address] in [place], [place].     Chief Complaint:  Bipolar Disorder, anxiety, PTSD    History of Present Illness:     1. Depression/mood: has improved  a. Things going good at home  b. \"honestly has been a lot better\"  2. Anxiety: has improved  a. Denies excessive worries   b. Working on positive coping skills  3. Samantha/hypomania: denies s/sx  4. PTSD: denies s/sx  5. Sleep disturbance: denies  6. Low energy: denies  7. Low motivation/Interest: denies  8. Appetite change: denies  9. Medication compliant  10. Side effects: denies  11. Refills needed  12. Denies SI HI AVH    ADHD: adderall helping. Feels as though wears off later in the day.   Inattention:   Often fails to give close attention to details or makes careless mistakes in schoolwork, at work, or during other activities- n  Often has difficulty sustaining attention in tasks- n  Often does not seem to listen when spoken to directly- n  Often does not follow through on instructions and fails to finish duties in the workplace- n  Often has difficulty organizing tasks and activities- n  Often avoids, dislikes or is reluctant to engage in tasks that require sustained mental effort- n  Often loses things necessary for tasks or activities- n  Is often easily distracted by extraneous stimuli- n  Is often forgetful in daily activities- n  Hyperactivity and Impulsivity:   Often fidgets with or taps hands or feet-n  Often leaves seat in situations when remaining seated is expected- n  Often feels restless- n  Is often " “on the go”, acting as if “driven by a motor”- n  Often talks excessively- n  Often blurts out an answer before a question has been completed- n  Often has difficulty waiting their turn- n  Often interrupts or intrudes on others- n    Coping skills: gardening, outside more     Neuropsychological evaluation 2/28/23 finding patient meets criteria for PTSD, bipolar disorder, ADHD and JARROD.     Access to Firearms: Denies    Past Surgical History:  Past Surgical History:   Procedure Laterality Date   • DILATATION AND CURETTAGE     • OTHER SURGICAL HISTORY      Surgical Clips   • ROTATOR CUFF REPAIR     • SHOULDER SURGERY     • WISDOM TOOTH EXTRACTION         Problem List:  Patient Active Problem List   Diagnosis   • Facial aging   • Anxiety   • Asthma   • Bipolar 1 disorder   • Depression   • Hashimoto's thyroiditis   • Migraine headache   • Nicotine dependence   • Predisposition to allergic reaction   • Hypothyroidism   • Thyroid disorder   • Facial aging   • Well woman exam with routine gynecological exam       Allergy:   Allergies   Allergen Reactions   • Meperidine Hives and Nausea And Vomiting   • Lamictal [Lamotrigine] Rash   • Tegaderm Ag Mesh [Silver] Rash        Discontinued Medications:  Medications Discontinued During This Encounter   Medication Reason   • amphetamine-dextroamphetamine XR (Adderall XR) 10 MG 24 hr capsule Historical Med - Therapy completed       Current Medications:   Current Outpatient Medications   Medication Sig Dispense Refill   • albuterol sulfate  (90 Base) MCG/ACT inhaler Inhale 2 puffs Every 6 (Six) Hours As Needed for Wheezing. 18 g 6   • amphetamine-dextroamphetamine XR (Adderall XR) 20 MG 24 hr capsule Take 1 capsule by mouth Every Morning for 30 days 30 capsule 0   • Budeson-Glycopyrrol-Formoterol (Breztri Aerosphere) 160-9-4.8 MCG/ACT aerosol inhaler Inhale 2 puffs 2 (Two) Times a Day. 10.7 g 3   • cholecalciferol (VITAMIN D3) 1.25 MG (29063 UT) capsule Take 1 capsule by  mouth 1 (One) Time Per Week. 13 capsule 3   • cyanocobalamin 1000 MCG/ML injection 1xweek for 4 weeks then monthly 1 mL 12   • desvenlafaxine (PRISTIQ) 50 MG 24 hr tablet Take 1 tablet by mouth Daily. 30 tablet 2   • fluticasone (FLONASE) 50 MCG/ACT nasal spray 1 spray into the nostril(s) as directed by provider 2 (Two) Times a Day for 30 days. (Patient taking differently: 1 spray into the nostril(s) as directed by provider 2 (Two) Times a Day As Needed. TAKING PRN) 16 g 0   • levothyroxine (SYNTHROID, LEVOTHROID) 50 MCG tablet Take 1 tablet by mouth Daily. 90 tablet 1   • medroxyPROGESTERone (Depo-Provera) 150 MG/ML injection Inject 1 mL into the appropriate muscle as directed by prescriber Every 3 (Three) Months. 1 mL 3   • medroxyPROGESTERone Acetate 150 MG/ML suspension prefilled syringe  (Patient not taking: Reported on 5/2/2023)     • montelukast (SINGULAIR) 10 MG tablet Take 1 tablet by mouth Every Evening. 90 tablet 1   • propranolol (INDERAL) 20 MG tablet Take 1 tablet by mouth 3 (Three) Times a Day. 90 tablet 5   • Rimegepant Sulfate (Nurtec) 75 MG tablet dispersible tablet Take 1 tablet by mouth Daily As Needed (Headache). 8 tablet 5     No current facility-administered medications for this visit.       Past Medical History:  Past Medical History:   Diagnosis Date   • Allergy    • Anxiety    • Asthma    • Bipolar 1 disorder    • Depression    • Facial aging    • Gastroesophageal reflux disease    • Migraine headache    • Nicotine dependence 04/08/2021   • Thyroid disorder        Past Psychiatric History:  Medication Trials: Abilify, Rexulti, lamictal, quetiapine, Vraylar for mood, with no benefits.  Prozac, Zoloft and Trintellix for depression and anxiety with little benefit.    Substance Abuse History:   Types: Denies  Withdrawal Symptoms: Denies  Longest Period Sober: Denies  AA: Denies      Social History     Socioeconomic History   • Marital status: Single   Tobacco Use   • Smoking status: Former      "Packs/day: 0.50     Years: 2.00     Pack years: 1.00     Types: Cigarettes     Quit date: 2020     Years since quitting: 3.4   • Smokeless tobacco: Never   • Tobacco comments:     former, 1 packs per day, smoked for 6-10 years   Vaping Use   • Vaping Use: Never used   Substance and Sexual Activity   • Alcohol use: Yes     Comment: Current some day  rarely drinks   • Drug use: Never   • Sexual activity: Yes     Birth control/protection: Injection         Family History   Problem Relation Age of Onset   • Stroke Mother    • Lung cancer Maternal Grandmother    • Heart disease Other    • Anxiety disorder Father    • Bipolar disorder Paternal Aunt    • Schizophrenia Paternal Aunt        Mental Status Exam:     Appearance: good eye contact, normal street clothes, groomed, sitting in chair   Behavior: pleasant and cooperative  Motor: no abnormal  Speech: normal rhythm, rate, volume, tone, not hyperverbal, not pressured, normal prosidy  Mood: \"Okay\"  Affect: euthymic  Thought Content: negative suicidal ideations, negative homicidal ideations, negative obsessions  Perceptions: negative auditory hallucinations, negative visual hallucinations, negative delusions, negative paranoia  Thought Process: goal directed, linear  Insight/Judgement: fair/fair  Cognition: grossly intact  Attention: intact  Orientation: person, place, time and situation  Memory: intact    Review of Systems:     Constitutional: Denies fatigue, night sweats  Eyes: Denies double vision, blurred vision  HENT: Denies vertigo, recent head injury  Cardiovascular: Denies chest pain, irregular heartbeats  Respiratory: Denies productive cough, shortness of breath  Gastrointestinal: Denies nausea, vomiting  Genitourinary: Denies dysuria, urinary retention  Integument: Denies hair growth change, new skin lesions  Neurologic: Denies altered mental status, seizures  Musculoskeletal: Denies joint swelling, limitation of motion  Endocrine: Denies cold intolerance, heat " intolerance  Psychiatric: Admits anxiety, depression.  Denies psychosis, kiersten, post-traumatic stress disorder, obsessive compulsive disorder.   Allergic-immunologic: Denies frequent illnesses      Vital Signs:   There were no vitals taken for this visit.     Lab Results:   Lab on 04/14/2023   Component Date Value Ref Range Status   • Amphet/Methamphet, Screen 04/14/2023 Negative  Negative Final   • Barbiturates Screen, Urine 04/14/2023 Negative  Negative Final   • Benzodiazepine Screen, Urine 04/14/2023 Negative  Negative Final   • Cocaine Screen, Urine 04/14/2023 Negative  Negative Final   • Opiate Screen 04/14/2023 Negative  Negative Final   • THC, Screen, Urine 04/14/2023 Negative  Negative Final   • Methadone Screen, Urine 04/14/2023 Negative  Negative Final   • Oxycodone Screen, Urine 04/14/2023 Negative  Negative Final   Lab on 11/28/2022   Component Date Value Ref Range Status   • Iron 11/28/2022 45  37 - 145 mcg/dL Final   Lab on 09/07/2022   Component Date Value Ref Range Status   • Color, UA 11/28/2022 Yellow  Yellow, Straw Final   • Appearance, UA 11/28/2022 Clear  Clear Final   • pH, UA 11/28/2022 7.5  5.0 - 8.0 Final   • Specific Gravity, UA 11/28/2022 1.009  1.005 - 1.030 Final   • Glucose, UA 11/28/2022 Negative  Negative Final   • Ketones, UA 11/28/2022 Negative  Negative Final   • Bilirubin, UA 11/28/2022 Negative  Negative Final   • Blood, UA 11/28/2022 Negative  Negative Final   • Protein, UA 11/28/2022 Negative  Negative Final   • Leuk Esterase, UA 11/28/2022 Trace (A)  Negative Final   • Nitrite, UA 11/28/2022 Negative  Negative Final   • Urobilinogen, UA 11/28/2022 0.2 E.U./dL  0.2 - 1.0 E.U./dL Final   • Hemochromatosis Gene 09/07/2022 Comment   Final    Comment: Result:  c.845G>A (p.Agb074Vff) - Not Detected  c.187C>G (p.Qvd62Gve) - Not Detected  c.193A>T (p.Zjx77Igo) - Not Detected  Not associated with increased risk to develop clinical symptoms  of Hereditary Hemochromatosis. In  symptomatic individuals, other  causes of iron overload should be evaluated. See Additional  Information and Comments.  Additional Clinical Information:  Hereditary hemochromatosis (HFE related) is an autosomal recessive  iron storage disorder. Patients may have a genetic diagnosis of  hereditary hemochromatosis and never show clinical symptoms. Clinical  symptoms typically appear between 40 to 60 years in males and after  menopause in females. Signs and symptoms may include organ damage,  primarily in the liver, risk for hepatocellular carcinoma, diabetes,  and heart disease due to iron accumulation. Life expectancy may be  decreased in individuals who develop cirrhosis. Treatment for  clinically symptomatic individuals may include therapeutic  phlebotomy. Liver                            transplant may be used to treat end stage liver  failure. For preventive care, monitoring for iron overload is  recommended for patients who are homozygous for c.845G>A  (p.Rme068Yst) and have yet to experience clinical symptoms.  Comments:  The most common HFE variants associated with hereditary  hemochromatosis are c.845G>A (p.Sdi048Qxv), c.187C>G (p.Eyf84Bqd),  c.193A>T (p.Whw73Ech). While patients homozygous for c.845G>A  (p.Voy420Nly) are the most likely to present clinical symptoms, less  than 10% develop clinically significant iron overload with tissue  and organ damage.  Genetic counseling is recommended to discuss the potential clinical  implications of positive results, as well as recommendations for  testing family members.  Genetic Coordinators are available for health care providers to  discuss results at [phone]).  Test Details:  Three variants analyzed:  c.845G>A (p.Tqy350Prx), commonly referred to as C282Y  c.187C>G (p.Tfo43Qoi), commonly referred to as                            H63D  c.193A>T (p.Epb37Hpu), commonly referred to as S65C  Methods/Limitations:  DNA Analysis of the HFE gene (NM_[id number])  was performed by PCR  amplification followed by restriction enzyme digestion analyses.  Results must be combined with clinical information for the most  accurate interpretation. Molecular-based testing is highly accurate,  but as in any laboratory test, diagnostic errors may occur. False  positive or false negative results may occur for reasons that  include genetic variants, blood transfusions, bone marrow  transplantation, somatic or tissue-specific mosaicism, mislabeled  samples, or erroneous representation of family relationships.  This test was developed and its performance characteristics  determined by [facility]. It has not been cleared or approved by the  Food and Drug Administration.  References:  Skyler BR, Vishal PC, Leann KV, Vasu LW, Isabela AS; American  Association for the Study of Liver Diseases. Diagnosis and management  of hemochromatosis: 2011                            practice guideline by the American  Association for the Study of Liver Diseases. Hepatology. 2011  Jul;54(1):328-43. doi: 10.1002/hep.06434. PMID: 37470264; PMCID:  XMI0702296.  Marlen G, Jalen P, Jessika ALAS, Robel H, Jarret O, Meño S,  Car I, Beau M, Jacquelyn S. [facility] best practice guidelines for the  molecular genetic diagnosis of hereditary hemochromatosis (HH). Eur  J Hum Princess. 2016 Apr;24(4):479-95. doi: 10.1038/ejhg.2015.128.  Epub 2015 Jul 8. PMID: 26484921; PMCID: HRT5255086.  Kateryna Christopher, PhD, FACMG  Sadiq Guajardo, PhD  Deyvi Eli, PhD, FAC  Federico Mo, PhD, FAC  Donn Ford, PhD, FAC  SIMA Arzate, PhD, FAC  Socorro Veras, PhD, FAC  Charmaine Trujillo, PhD, FACMG   • RBC, UA 11/28/2022 0-2  None Seen, 0-2 /HPF Final   • WBC, UA 11/28/2022 0-2  None Seen, 0-2 /HPF Final   • Bacteria, UA 11/28/2022 None Seen  None Seen /HPF Final   • Squamous Epithelial Cells, UA 11/28/2022 0-2  None Seen, 0-2 /HPF Final   • Hyaline Casts, UA 11/28/2022 None Seen  None Seen /LPF Final   • Methodology  11/28/2022 Automated Microscopy   Final   Lab on 08/31/2022   Component Date Value Ref Range Status   • Total Cholesterol 08/31/2022 169  0 - 200 mg/dL Final   • Triglycerides 08/31/2022 89  0 - 150 mg/dL Final   • HDL Cholesterol 08/31/2022 58  40 - 60 mg/dL Final   • LDL Cholesterol  08/31/2022 95  0 - 100 mg/dL Final   • VLDL Cholesterol 08/31/2022 16  5 - 40 mg/dL Final   • LDL/HDL Ratio 08/31/2022 1.61   Final   • Glucose 08/31/2022 83  65 - 99 mg/dL Final   • BUN 08/31/2022 11  6 - 20 mg/dL Final   • Creatinine 08/31/2022 0.77  0.57 - 1.00 mg/dL Final   • Sodium 08/31/2022 139  136 - 145 mmol/L Final   • Potassium 08/31/2022 4.3  3.5 - 5.2 mmol/L Final   • Chloride 08/31/2022 108 (H)  98 - 107 mmol/L Final   • CO2 08/31/2022 23.0  22.0 - 29.0 mmol/L Final   • Calcium 08/31/2022 9.5  8.6 - 10.5 mg/dL Final   • Total Protein 08/31/2022 6.7  6.0 - 8.5 g/dL Final   • Albumin 08/31/2022 4.80  3.50 - 5.20 g/dL Final   • ALT (SGPT) 08/31/2022 6  1 - 33 U/L Final   • AST (SGOT) 08/31/2022 11  1 - 32 U/L Final   • Alkaline Phosphatase 08/31/2022 48  39 - 117 U/L Final   • Total Bilirubin 08/31/2022 0.4  0.0 - 1.2 mg/dL Final   • Globulin 08/31/2022 1.9  gm/dL Final   • A/G Ratio 08/31/2022 2.5  g/dL Final   • BUN/Creatinine Ratio 08/31/2022 14.3  7.0 - 25.0 Final   • Anion Gap 08/31/2022 8.0  5.0 - 15.0 mmol/L Final   • eGFR 08/31/2022 109.3  >60.0 mL/min/1.73 Final    National Kidney Foundation and American Society of Nephrology (ASN) Task Force recommended calculation based on the Chronic Kidney Disease Epidemiology Collaboration (CKD-EPI) equation refit without adjustment for race.   • TSH 08/31/2022 1.980  0.270 - 4.200 uIU/mL Final   • Free T4 08/31/2022 1.00  0.93 - 1.70 ng/dL Final   • 25 Hydroxy, Vitamin D 08/31/2022 62.1  30.0 - 100.0 ng/ml Final   • Vitamin B-12 08/31/2022 225  211 - 946 pg/mL Final   • Folate 08/31/2022 8.96  4.78 - 24.20 ng/mL Final   • Iron 08/31/2022 151 (H)  37 - 145 mcg/dL Final   • WBC  08/31/2022 4.87  3.40 - 10.80 10*3/mm3 Final   • RBC 08/31/2022 4.42  3.77 - 5.28 10*6/mm3 Final   • Hemoglobin 08/31/2022 13.2  12.0 - 15.9 g/dL Final   • Hematocrit 08/31/2022 39.8  34.0 - 46.6 % Final   • MCV 08/31/2022 90.0  79.0 - 97.0 fL Final   • MCH 08/31/2022 29.9  26.6 - 33.0 pg Final   • MCHC 08/31/2022 33.2  31.5 - 35.7 g/dL Final   • RDW 08/31/2022 12.5  12.3 - 15.4 % Final   • RDW-SD 08/31/2022 40.8  37.0 - 54.0 fl Final   • MPV 08/31/2022 11.2  6.0 - 12.0 fL Final   • Platelets 08/31/2022 259  140 - 450 10*3/mm3 Final   • Neutrophil % 08/31/2022 54.8  42.7 - 76.0 % Final   • Lymphocyte % 08/31/2022 34.1  19.6 - 45.3 % Final   • Monocyte % 08/31/2022 6.0  5.0 - 12.0 % Final   • Eosinophil % 08/31/2022 4.5  0.3 - 6.2 % Final   • Basophil % 08/31/2022 0.4  0.0 - 1.5 % Final   • Immature Grans % 08/31/2022 0.2  0.0 - 0.5 % Final   • Neutrophils, Absolute 08/31/2022 2.67  1.70 - 7.00 10*3/mm3 Final   • Lymphocytes, Absolute 08/31/2022 1.66  0.70 - 3.10 10*3/mm3 Final   • Monocytes, Absolute 08/31/2022 0.29  0.10 - 0.90 10*3/mm3 Final   • Eosinophils, Absolute 08/31/2022 0.22  0.00 - 0.40 10*3/mm3 Final   • Basophils, Absolute 08/31/2022 0.02  0.00 - 0.20 10*3/mm3 Final   • Immature Grans, Absolute 08/31/2022 0.01  0.00 - 0.05 10*3/mm3 Final   • nRBC 08/31/2022 0.0  0.0 - 0.2 /100 WBC Final   • Iron 08/31/2022 156 (H)  37 - 145 mcg/dL Final   • Iron Saturation 08/31/2022 40  20 - 50 % Final   • Transferrin 08/31/2022 265  200 - 360 mg/dL Final   • TIBC 08/31/2022 395  298 - 536 mcg/dL Final   • Ferritin 08/31/2022 70.30  13.00 - 150.00 ng/mL Final       EKG Results:  No orders to display       Imaging Results:  No Images in the past 120 days found..      Assessment & Plan   Diagnoses and all orders for this visit:    1. Generalized anxiety disorder (Primary)    2. Major depressive disorder, recurrent episode, severe, with psychosis    3. Insomnia due to other mental disorder    4. Post traumatic stress  disorder (PTSD)    5. ADHD (attention deficit hyperactivity disorder), inattentive type  -     amphetamine-dextroamphetamine XR (Adderall XR) 20 MG 24 hr capsule; Take 1 capsule by mouth Every Morning for 30 days  Dispense: 30 capsule; Refill: 0       Continue desvenlafaxine to target depression and anxiety. Psychotherapy for PTSD. Sleep hygiene education for insomnia. Increase adderall to target ADHD. 16 minutes of supportive psychotherapy with goal to strengthen defenses, promote problems solving, restore adaptive functioning and provide symptom relief. The therapeutic alliance was strengthened to encourage the patient to express their thoughts and feelings. Esteem building was enhanced through praise, reassurance, normalizing and encouragement. Coping skills were enhanced to build distress tolerance skills and emotional regulation. Allowed patient to freely discuss issues without interruption or judgement with unconditional positive regard, active listening skills, and empathy. Provided a safe, confidential environment to facilitate the development of a positive therapeutic relationship and encourage open, honest communication. Assisted patient in identifying risk factors which would indicate the need for higher level of care including thoughts to harm self or others and/or self-harming behavior and encouraged patient to contact this office, call 911, or present to the nearest emergency room should any of these events occur. Assisted patient in processing session content; acknowledged and normalized patient's thoughts, feelings, and concerns by utilizing a person-centered approach in efforts to build appropriate rapport and a positive therapeutic relationship with open and honest communication. Patient given education on medication side effects, diagnosis/illness and relapse symptoms. Plan to continue supportive psychotherapy in next appointment to provide symptom relief. 4 weeks    Diagnoses: as above  Symptoms:  as above  Functional status: good  Mental Status Exam: as above     Treatment plan: medication management and supportive psychotherapy  Prognosis: good  Progress: continued improvement    Visit Diagnoses:    ICD-10-CM ICD-9-CM   1. Generalized anxiety disorder  F41.1 300.02   2. Major depressive disorder, recurrent episode, severe, with psychosis  F33.3 296.34   3. Insomnia due to other mental disorder  F51.05 300.9    F99 327.02   4. Post traumatic stress disorder (PTSD)  F43.10 309.81   5. ADHD (attention deficit hyperactivity disorder), inattentive type  F90.0 314.00       PLAN:  13. Safety: No acute safety concerns.   14. Therapy: Continue [facility] at [facility]  15. Risk Assessment: Risk of self-harm acutely is moderate.  Risk factors include anxiety disorder, mood disorder, history of self harm and recent psychosocial stressors (pandemic). Protective factors include no family history, denies access to guns/weapons, no present SI, no history of suicide attempts, minimal AODA, healthcare seeking, future orientation, willingness to engage in care.  Risk of self-harm chronically is also moderate, but could be further elevated in the event of treatment noncompliance and/or AODA.  16. Medications: Continue desvenlafaxine 50 mg p.o. daily to target depression and anxiety.  Discussed all risks, benefits, alternatives, and side effects of Pristiq including but not limited to GI symptoms (N/V/D, constipation), sexual dysfunction, dizziness, insomnia, hyperhidrosis, anxiety, bleeding risk, seizure risk, CNS depression, dyslipidemia, activation of kiersten or hypomania, increased fragility fracture risk, hyponatremia, ocular effects, HTN, withdrawal syndrome following abrupt discontinuation, serotonin syndrome, and activation of suicidal ideation and behavior.  Discussed the need for pt to immediately call the office for any new or worsening symptoms, such as worsening depression; feeling nervous or  restless; suicidal thoughts or actions; or other changes changes in mood or behavior, and all other concerns. Pt educated on med compliance and the risks of suddenly stopping this medication or missing doses. Pt verbalized understanding and is agreeable to taking Pristiq. Addressed all questions and concerns. Increase adderall xr 10mg to 20mg po qday to target ADHD. Risks, benefits, side effects discussed with patient including elevated heart rate, elevated blood pressure, irritability, insomnia, sexual dysfunction, appetite suppressing properties, psychosis.  After discussion of these risks and benefits, the patient voiced understanding and agreed to proceed. Rodney reviewed, UDS ordered, and controlled substance agreement signed & witnessed.  17. Labs/studies: 4/14/23 negative  18. Follow-up: 1 month      TREATMENT PLAN/GOALS: Continue supportive psychotherapy efforts and medications as indicated. Treatment and medication options discussed during today's visit. Patient ackowledged and verbally consented to continue with current treatment plan and was educated on the importance of compliance with treatment and follow-up appointments.    MEDICATION ISSUES:  RODNEY reviewed as expected.  Discussed medication options and treatment plan of prescribed medication as well as the risks, benefits, and side effects including potential falls, possible impaired driving and metabolic adversities among others. Patient is agreeable to call the office with any worsening of symptoms or onset of side effects. Patient is agreeable to call 911 or go to the nearest ER should he/she begin having SI/HI. No medication side effects or related complaints today.     MEDS ORDERED DURING VISIT:  New Medications Ordered This Visit   Medications   • amphetamine-dextroamphetamine XR (Adderall XR) 20 MG 24 hr capsule     Sig: Take 1 capsule by mouth Every Morning for 30 days     Dispense:  30 capsule     Refill:  0       1 month follow up        This document has been electronically signed by LACHELLE Conde  June 1, 2023 12:59 EDT      Part of this note may be an electronic transcription/translation of spoken language to printed text using the Dragon Dictation System.

## 2023-07-31 DIAGNOSIS — F90.0 ADHD (ATTENTION DEFICIT HYPERACTIVITY DISORDER), INATTENTIVE TYPE: ICD-10-CM

## 2023-07-31 RX ORDER — DEXTROAMPHETAMINE SACCHARATE, AMPHETAMINE ASPARTATE MONOHYDRATE, DEXTROAMPHETAMINE SULFATE AND AMPHETAMINE SULFATE 5; 5; 5; 5 MG/1; MG/1; MG/1; MG/1
20 CAPSULE, EXTENDED RELEASE ORAL EVERY MORNING
Qty: 30 CAPSULE | Refills: 0 | Status: SHIPPED | OUTPATIENT
Start: 2023-08-01 | End: 2023-08-31

## 2023-08-31 ENCOUNTER — TELEMEDICINE (OUTPATIENT)
Dept: PSYCHIATRY | Facility: CLINIC | Age: 28
End: 2023-08-31
Payer: COMMERCIAL

## 2023-08-31 DIAGNOSIS — F90.0 ADHD (ATTENTION DEFICIT HYPERACTIVITY DISORDER), INATTENTIVE TYPE: ICD-10-CM

## 2023-08-31 DIAGNOSIS — F41.1 GENERALIZED ANXIETY DISORDER: ICD-10-CM

## 2023-08-31 DIAGNOSIS — F51.05 INSOMNIA DUE TO OTHER MENTAL DISORDER: ICD-10-CM

## 2023-08-31 DIAGNOSIS — F43.10 POST TRAUMATIC STRESS DISORDER (PTSD): ICD-10-CM

## 2023-08-31 DIAGNOSIS — F31.30 BIPOLAR I DISORDER, MOST RECENT EPISODE DEPRESSED: Primary | ICD-10-CM

## 2023-08-31 DIAGNOSIS — F99 INSOMNIA DUE TO OTHER MENTAL DISORDER: ICD-10-CM

## 2023-08-31 RX ORDER — DEXMETHYLPHENIDATE HYDROCHLORIDE 20 MG/1
20 CAPSULE, EXTENDED RELEASE ORAL DAILY
Qty: 30 CAPSULE | Refills: 0 | Status: SHIPPED | OUTPATIENT
Start: 2023-08-31

## 2023-08-31 RX ORDER — DEXMETHYLPHENIDATE HYDROCHLORIDE 20 MG/1
20 CAPSULE, EXTENDED RELEASE ORAL DAILY
Qty: 30 CAPSULE | Refills: 0 | Status: SHIPPED | OUTPATIENT
Start: 2023-08-31 | End: 2023-08-31 | Stop reason: SDUPTHER

## 2023-08-31 NOTE — TELEPHONE ENCOUNTER
Patient calling because Talib does not have her medication    Patient requesting medications be re routed to [facility]    Med pended

## 2023-08-31 NOTE — PROGRESS NOTES
"Subjective   Maurice Lazo is a 27 y.o. female who presents today for follow up. Mode of visit: Video  Location of provider: Home  Location of patient: Home  Does the patient consent to use a video/audio connection for your medical care today? Yes  The visit included audio and video interaction. No technical issues occurred during this visit.    Referring Provider:  No referring provider defined for this encounter.    Chief Complaint:  Depression, anxiety    History of Present Illness:     Depression/mood: has improved  Things going good at home   Anxiety: has improved  Working on positive coping skills  Samantha/hypomania: denies s/sx  PTSD: denies s/sx   Sleep disturbance: endorses   Low energy: denies  Substance use: denies   Medication compliant  Side effects: appetite decrease with adderall  Refills needed    ADHD:   Inattention:   Often fails to give close attention to details or makes careless mistakes in schoolwork, at work, or during other activities- n  Often has difficulty sustaining attention in tasks- n  Often does not seem to listen when spoken to directly- n  Often does not follow through on instructions and fails to finish duties in the workplace- n  Often has difficulty organizing tasks and activities- n  Often avoids, dislikes or is reluctant to engage in tasks that require sustained mental effort- n  Often loses things necessary for tasks or activities- n  Is often easily distracted by extraneous stimuli- n  Is often forgetful in daily activities- n  Hyperactivity and Impulsivity:   Often fidgets with or taps hands or feet-n  Often leaves seat in situations when remaining seated is expected- n  Often feels restless- n  Is often "on the go", acting as if "driven by a motor"- n  Often talks excessively- n  Often blurts out an answer before a question has been completed- n  Often has difficulty waiting their turn- n  Often interrupts or intrudes on others- n    Coping skills: gardening, outside more "     Access to Firearms: Denies    PHQ-9 Depression Screening  PHQ-9 Total Score: (P) 11    Little interest or pleasure in doing things? (P) 0-->not at all   Feeling down, depressed, or hopeless? (P) 0-->not at all   Trouble falling or staying asleep, or sleeping too much? (P) 3-->nearly every day   Feeling tired or having little energy? (P) 3-->nearly every day   Poor appetite or overeating? (P) 3-->nearly every day   Feeling bad about yourself - or that you are a failure or have let yourself or your family down? (P) 1-->several days   Trouble concentrating on things, such as reading the newspaper or watching television? (P) 1-->several days   Moving or speaking so slowly that other people could have noticed? Or the opposite - being so fidgety or restless that you have been moving around a lot more than usual? (P) 0-->not at all   Thoughts that you would be better off dead, or of hurting yourself in some way? (P) 0-->not at all   PHQ-9 Total Score (P) 11     JARROD-7  Feeling nervous, anxious or on edge: (P) Several days  Not being able to stop or control worrying: (P) More than half the days  Worrying too much about different things: (P) Not at all  Trouble Relaxing: (P) Several days  Being so restless that it is hard to sit still: (P) Several days  Feeling afraid as if something awful might happen: (P) Not at all  Becoming easily annoyed or irritable: (P) Several days  JARROD 7 Total Score: (P) 6  If you checked any problems, how difficult have these problems made it for you to do your work, take care of things at home, or get along with other people: (P) Somewhat difficult    Past Surgical History:  Past Surgical History:   Procedure Laterality Date    DILATATION AND CURETTAGE      OTHER SURGICAL HISTORY      Surgical Clips    ROTATOR CUFF REPAIR      SHOULDER SURGERY      WISDOM TOOTH EXTRACTION         Problem List:  Patient Active Problem List   Diagnosis    Facial aging    Anxiety    Asthma    Bipolar 1 disorder     Depression    Hashimoto's thyroiditis    Migraine headache    Nicotine dependence    Predisposition to allergic reaction    Hypothyroidism    Thyroid disorder    Facial aging    Well woman exam with routine gynecological exam       Allergy:   Allergies   Allergen Reactions    Meperidine Hives and Nausea And Vomiting    Lamictal [Lamotrigine] Rash    Tegaderm Ag Mesh [Silver] Rash        Discontinued Medications:  Medications Discontinued During This Encounter   Medication Reason    amphetamine-dextroamphetamine XR (Adderall XR) 20 MG 24 hr capsule Historical Med - Therapy completed    medroxyPROGESTERone Acetate 150 MG/ML suspension prefilled syringe Historical Med - Therapy completed       Current Medications:   Current Outpatient Medications   Medication Sig Dispense Refill    albuterol sulfate  (90 Base) MCG/ACT inhaler Inhale 2 puffs Every 6 (Six) Hours As Needed for Wheezing. 18 g 6    Budeson-Glycopyrrol-Formoterol (Breztri Aerosphere) 160-9-4.8 MCG/ACT aerosol inhaler Inhale 2 puffs 2 (Two) Times a Day. 10.7 g 3    cholecalciferol (VITAMIN D3) 1.25 MG (63058 UT) capsule Take 1 capsule by mouth 1 (One) Time Per Week. 13 capsule 3    cyanocobalamin 1000 MCG/ML injection 1xweek for 4 weeks then monthly 1 mL 12    desvenlafaxine (PRISTIQ) 50 MG 24 hr tablet Take 1 tablet by mouth Daily. 30 tablet 2    dexmethylphenidate XR (Focalin XR) 20 MG 24 hr capsule Take 1 capsule by mouth Daily 30 capsule 0    fluticasone (FLONASE) 50 MCG/ACT nasal spray 1 spray into the nostril(s) as directed by provider 2 (Two) Times a Day for 30 days. (Patient taking differently: 1 spray into the nostril(s) as directed by provider 2 (Two) Times a Day As Needed. TAKING PRN) 16 g 0    levothyroxine (SYNTHROID, LEVOTHROID) 50 MCG tablet Take 1 tablet by mouth Daily. 90 tablet 1    montelukast (SINGULAIR) 10 MG tablet Take 1 tablet by mouth Every Evening. 90 tablet 1    propranolol (INDERAL) 20 MG tablet Take 1 tablet by mouth 3  (Three) Times a Day. 90 tablet 5    Rimegepant Sulfate (Nurtec) 75 MG tablet dispersible tablet Take 1 tablet by mouth Daily As Needed (Headache). 8 tablet 5     No current facility-administered medications for this visit.       Past Medical History:  Past Medical History:   Diagnosis Date    Allergy     Anxiety     Asthma     Bipolar 1 disorder     Depression     Facial aging     Gastroesophageal reflux disease     Migraine headache     Nicotine dependence 04/08/2021    Thyroid disorder        Past Psychiatric History:  Medication Trials: Abilify, Rexulti, lamictal, quetiapine, Vraylar, Prozac, Zoloft, Trintellix, adderall      Substance Abuse History:   Types: Denies  Withdrawal Symptoms: Denies  Longest Period Sober: Denies  AA: Denies    Social History     Socioeconomic History    Marital status: Single   Tobacco Use    Smoking status: Former     Packs/day: 0.50     Years: 2.00     Pack years: 1.00     Types: Cigarettes     Quit date: 2020     Years since quitting: 3.6    Smokeless tobacco: Never    Tobacco comments:     former, 1 packs per day, smoked for 6-10 years   Vaping Use    Vaping Use: Never used   Substance and Sexual Activity    Alcohol use: Yes     Comment: Current some day  rarely drinks    Drug use: Never    Sexual activity: Yes     Birth control/protection: Injection       Family History   Problem Relation Age of Onset    Stroke Mother     Lung cancer Maternal Grandmother     Heart disease Other     Anxiety disorder Father     Bipolar disorder Paternal Aunt     Schizophrenia Paternal Aunt        Mental Status Exam:   Hygiene:   good  Cooperation:  Cooperative  Eye Contact:  Good  Psychomotor Behavior:  Appropriate  Affect:  Full range  Mood: normal  Hopelessness: Denies  Speech:  Normal  Thought Process:  Goal directed  Thought Content:  Normal  Suicidal:  None  Homicidal:  None  Hallucinations:  None  Delusion:  None  Memory:  Intact  Orientation:  Person, Place, Time, and  Situation  Reliability:  good  Insight:  Good  Judgement:  Good  Impulse Control:  Good  Physical/Medical Issues:  No      Review of Systems:  Review of Systems   Constitutional:  Negative for appetite change, diaphoresis, fatigue and unexpected weight change.   HENT:  Negative for drooling, tinnitus and trouble swallowing.    Eyes:  Negative for visual disturbance.   Respiratory:  Negative for cough, chest tightness and shortness of breath.    Cardiovascular:  Negative for chest pain and palpitations.   Gastrointestinal:  Negative for abdominal pain, constipation, diarrhea, nausea and vomiting.   Endocrine: Negative for cold intolerance and heat intolerance.   Genitourinary:  Negative for difficulty urinating.   Musculoskeletal:  Negative for arthralgias and myalgias.   Skin:  Negative for rash.   Allergic/Immunologic: Negative for immunocompromised state.   Neurological:  Negative for dizziness, tremors, seizures and headaches.   Psychiatric/Behavioral:  Negative for agitation, dysphoric mood, hallucinations, self-injury, sleep disturbance and suicidal ideas. The patient is not nervous/anxious.      Vital Signs:   There were no vitals taken for this visit.     Lab Results:   Lab on 04/14/2023   Component Date Value Ref Range Status    Amphet/Methamphet, Screen 04/14/2023 Negative  Negative Final    Barbiturates Screen, Urine 04/14/2023 Negative  Negative Final    Benzodiazepine Screen, Urine 04/14/2023 Negative  Negative Final    Cocaine Screen, Urine 04/14/2023 Negative  Negative Final    Opiate Screen 04/14/2023 Negative  Negative Final    THC, Screen, Urine 04/14/2023 Negative  Negative Final    Methadone Screen, Urine 04/14/2023 Negative  Negative Final    Oxycodone Screen, Urine 04/14/2023 Negative  Negative Final       EKG Results:  No orders to display       Imaging Results:  No Images in the past 120 days found..    Assessment & Plan   Diagnoses and all orders for this visit:    1. Bipolar I disorder, most  recent episode depressed (Primary)    2. Generalized anxiety disorder    3. Post traumatic stress disorder (PTSD)    4. Insomnia due to other mental disorder    5. ADHD (attention deficit hyperactivity disorder), inattentive type  -     dexmethylphenidate XR (Focalin XR) 20 MG 24 hr capsule; Take 1 capsule by mouth Daily  Dispense: 30 capsule; Refill: 0        Continue desvenlafaxine to target depression and anxiety. Psychotherapy for PTSD. Sleep hygiene education for insomnia. Appetite decrease with adderall. Stop adderall. Start focalin to target adhd. Supportive psychotherapy with goal to strengthen defenses, promote problems solving, restore adaptive functioning and provide symptom relief. Stressors: no specific triggers.  Coping skills utilized: Positive Distraction. Current goal: Practice stress management techniques. The therapeutic alliance was strengthened to encourage the patient to express their thoughts and feelings. and Esteem building was enhanced through praise, reassurance, normalizing and encouragement.  Assisted patient in identifying risk factors which would indicate the need for higher level of care including thoughts to harm self or others and/or self-harming behavior and encouraged patient to contact this office, call 911, or present to the nearest emergency room should any of these events occur. Patient given education on medication side effects, diagnosis/illness and relapse symptoms.  Plan to continue supportive psychotherapy in next appointment to provide symptom relief. At least 20 minutes of coping skill utilization recommended per day. 16 minutes of supportive psychotherapy. 8 weeks    Diagnoses: as above  Symptoms: as above  Functional status: good  Mental Status Exam: as above     Treatment plan: medication management and supportive psychotherapy  Prognosis: good  Progress: Continued Improvement    Visit Diagnoses:    ICD-10-CM ICD-9-CM   1. Bipolar I disorder, most recent episode  depressed  F31.30 296.50   2. Generalized anxiety disorder  F41.1 300.02   3. Post traumatic stress disorder (PTSD)  F43.10 309.81   4. Insomnia due to other mental disorder  F51.05 300.9    F99 327.02   5. ADHD (attention deficit hyperactivity disorder), inattentive type  F90.0 314.00       PLAN:  Safety: No acute safety concerns  Therapy: Declines  Risk Assessment: Risk of self-harm acutely is moderate.  Risk factors include anxiety disorder, mood disorder, and recent psychosocial stressors (pandemic). Protective factors include no family history, denies access to guns/weapons, no present SI, no history of suicide attempts or self-harm in the past, minimal AODA, healthcare seeking, future orientation, willingness to engage in care.  Risk of self-harm chronically is also moderate, but could be further elevated in the event of treatment noncompliance and/or AODA.  Meds: Continue desvenlafaxine 50 mg p.o. daily to target depression and anxiety.  Discussed all risks, benefits, alternatives, and side effects of Pristiq including but not limited to GI symptoms (N/V/D, constipation), sexual dysfunction, dizziness, insomnia, hyperhidrosis, anxiety, bleeding risk, seizure risk, CNS depression, dyslipidemia, activation of kiersten or hypomania, increased fragility fracture risk, hyponatremia, ocular effects, HTN, withdrawal syndrome following abrupt discontinuation, serotonin syndrome, and activation of suicidal ideation and behavior.  Discussed the need for pt to immediately call the office for any new or worsening symptoms, such as worsening depression; feeling nervous or restless; suicidal thoughts or actions; or other changes changes in mood or behavior, and all other concerns. Pt educated on med compliance and the risks of suddenly stopping this medication or missing doses. Pt verbalized understanding and is agreeable to taking Pristiq. Addressed all questions and concerns. Stop adderall. Start focalin xr 20mg po qday to  target ADHD. Risks, benefits, side effects discussed with patient including elevated heart rate, elevated blood pressure, irritability, insomnia, sexual dysfunction, appetite suppressing properties, psychosis.  After discussion of these risks and benefits, the patient voiced understanding and agreed to proceed. Rodney reviewed, UDS ordered, and controlled substance agreement signed & witnessed.  Labs:  4/14/23 UDS negative   Follow up: 8 weeks    Patient screened positive for depression based on a PHQ-9 score of 11 on 8/24/2023. Follow-up recommendations include: Suicide Risk Assessment performed.           TREATMENT PLAN/GOALS: Continue supportive psychotherapy efforts and medications as indicated. Treatment and medication options discussed during today's visit. Patient acknowledged and verbally consented to continue with current treatment plan and was educated on the importance of compliance with treatment and follow-up appointments.    MEDICATION ISSUES:  RODNEY reviewed as expected.  Discussed medication options and treatment plan of prescribed medication as well as the risks, benefits, and side effects including potential falls, possible impaired driving and metabolic adversities among others. Patient is agreeable to call the office with any worsening of symptoms or onset of side effects. Patient is agreeable to call 911 or go to the nearest ER should he/she begin having SI/HI. No medication side effects or related complaints today.     MEDS ORDERED DURING VISIT:  New Medications Ordered This Visit   Medications    dexmethylphenidate XR (Focalin XR) 20 MG 24 hr capsule     Sig: Take 1 capsule by mouth Daily     Dispense:  30 capsule     Refill:  0       Return in about 4 weeks (around 9/28/2023) for Next scheduled follow up.         This document has been electronically signed by LACHELLE Conde  August 31, 2023 13:10 EDT    Dictated Utilizing Dragon Dictation: Part of this note may be an electronic  transcription/translation of spoken language to printed text using the Dragon Dictation System.

## 2023-09-02 DIAGNOSIS — F33.3 MAJOR DEPRESSIVE DISORDER, RECURRENT EPISODE, SEVERE, WITH PSYCHOSIS: ICD-10-CM

## 2023-09-05 RX ORDER — DESVENLAFAXINE SUCCINATE 50 MG/1
TABLET, EXTENDED RELEASE ORAL
Qty: 30 TABLET | Refills: 2 | Status: SHIPPED | OUTPATIENT
Start: 2023-09-05

## 2024-04-05 DIAGNOSIS — Z30.9 ENCOUNTER FOR CONTRACEPTIVE MANAGEMENT, UNSPECIFIED TYPE: ICD-10-CM

## 2024-04-05 RX ORDER — MEDROXYPROGESTERONE ACETATE 150 MG/ML
INJECTION, SUSPENSION INTRAMUSCULAR
Qty: 1 ML | Refills: 3 | OUTPATIENT
Start: 2024-04-05